# Patient Record
Sex: FEMALE | Race: BLACK OR AFRICAN AMERICAN | NOT HISPANIC OR LATINO | Employment: FULL TIME | URBAN - NONMETROPOLITAN AREA
[De-identification: names, ages, dates, MRNs, and addresses within clinical notes are randomized per-mention and may not be internally consistent; named-entity substitution may affect disease eponyms.]

---

## 2018-01-22 ENCOUNTER — OFFICE VISIT CONVERTED (OUTPATIENT)
Dept: FAMILY MEDICINE CLINIC | Age: 53
End: 2018-01-22
Attending: NURSE PRACTITIONER

## 2018-10-25 ENCOUNTER — OFFICE VISIT CONVERTED (OUTPATIENT)
Dept: FAMILY MEDICINE CLINIC | Age: 53
End: 2018-10-25
Attending: NURSE PRACTITIONER

## 2019-02-11 ENCOUNTER — OFFICE VISIT CONVERTED (OUTPATIENT)
Dept: FAMILY MEDICINE CLINIC | Age: 54
End: 2019-02-11
Attending: NURSE PRACTITIONER

## 2019-02-25 ENCOUNTER — HOSPITAL ENCOUNTER (OUTPATIENT)
Dept: OTHER | Facility: HOSPITAL | Age: 54
Discharge: HOME | End: 2019-02-25
Attending: NURSE PRACTITIONER

## 2019-04-30 ENCOUNTER — OFFICE VISIT CONVERTED (OUTPATIENT)
Dept: FAMILY MEDICINE CLINIC | Age: 54
End: 2019-04-30
Attending: NURSE PRACTITIONER

## 2019-05-25 ENCOUNTER — HOSPITAL ENCOUNTER (OUTPATIENT)
Dept: OTHER | Facility: HOSPITAL | Age: 54
Discharge: HOME | End: 2019-05-25
Attending: NURSE PRACTITIONER

## 2019-05-28 LAB — MEV IGG SER IA-ACNC: 52.8 AU/ML

## 2019-11-12 ENCOUNTER — OFFICE VISIT CONVERTED (OUTPATIENT)
Dept: FAMILY MEDICINE CLINIC | Age: 54
End: 2019-11-12
Attending: NURSE PRACTITIONER

## 2019-11-18 ENCOUNTER — OFFICE VISIT CONVERTED (OUTPATIENT)
Dept: FAMILY MEDICINE CLINIC | Age: 54
End: 2019-11-18
Attending: NURSE PRACTITIONER

## 2019-11-18 ENCOUNTER — HOSPITAL ENCOUNTER (OUTPATIENT)
Dept: OTHER | Facility: HOSPITAL | Age: 54
Discharge: HOME | End: 2019-11-18
Attending: NURSE PRACTITIONER

## 2019-11-18 LAB
ANION GAP SERPL CALC-SCNC: 17 MMOL/L (ref 8–19)
BUN SERPL-MCNC: 17 MG/DL (ref 5–25)
BUN/CREAT SERPL: 16 {RATIO} (ref 6–20)
CALCIUM SERPL-MCNC: 9.6 MG/DL (ref 8.7–10.4)
CHLORIDE SERPL-SCNC: 101 MMOL/L (ref 99–111)
CONV CO2: 27 MMOL/L (ref 22–32)
CREAT UR-MCNC: 1.08 MG/DL (ref 0.5–0.9)
GFR SERPLBLD BASED ON 1.73 SQ M-ARVRAT: >60 ML/MIN/{1.73_M2}
GLUCOSE SERPL-MCNC: 113 MG/DL (ref 65–99)
OSMOLALITY SERPL CALC.SUM OF ELEC: 294 MOSM/KG (ref 273–304)
POTASSIUM SERPL-SCNC: 3.9 MMOL/L (ref 3.5–5.3)
SODIUM SERPL-SCNC: 141 MMOL/L (ref 135–147)

## 2020-01-13 ENCOUNTER — HOSPITAL ENCOUNTER (OUTPATIENT)
Dept: OTHER | Facility: HOSPITAL | Age: 55
Discharge: HOME | End: 2020-01-13
Attending: NURSE PRACTITIONER

## 2020-01-13 LAB
ANION GAP SERPL CALC-SCNC: 16 MMOL/L (ref 8–19)
BUN SERPL-MCNC: 15 MG/DL (ref 5–25)
BUN/CREAT SERPL: 15 {RATIO} (ref 6–20)
CALCIUM SERPL-MCNC: 9.7 MG/DL (ref 8.7–10.4)
CHLORIDE SERPL-SCNC: 99 MMOL/L (ref 99–111)
CONV CO2: 27 MMOL/L (ref 22–32)
CREAT UR-MCNC: 0.99 MG/DL (ref 0.5–0.9)
GFR SERPLBLD BASED ON 1.73 SQ M-ARVRAT: >60 ML/MIN/{1.73_M2}
GLUCOSE SERPL-MCNC: 104 MG/DL (ref 65–99)
OSMOLALITY SERPL CALC.SUM OF ELEC: 287 MOSM/KG (ref 273–304)
POTASSIUM SERPL-SCNC: 4.3 MMOL/L (ref 3.5–5.3)
SODIUM SERPL-SCNC: 138 MMOL/L (ref 135–147)

## 2020-01-21 ENCOUNTER — OFFICE VISIT CONVERTED (OUTPATIENT)
Dept: FAMILY MEDICINE CLINIC | Age: 55
End: 2020-01-21
Attending: NURSE PRACTITIONER

## 2020-02-10 ENCOUNTER — OFFICE VISIT CONVERTED (OUTPATIENT)
Dept: FAMILY MEDICINE CLINIC | Age: 55
End: 2020-02-10
Attending: NURSE PRACTITIONER

## 2020-06-26 ENCOUNTER — HOSPITAL ENCOUNTER (OUTPATIENT)
Dept: OTHER | Facility: HOSPITAL | Age: 55
Discharge: HOME | End: 2020-06-26
Attending: NURSE PRACTITIONER

## 2020-08-11 ENCOUNTER — HOSPITAL ENCOUNTER (OUTPATIENT)
Dept: OTHER | Facility: HOSPITAL | Age: 55
Discharge: HOME | End: 2020-08-11
Attending: NURSE PRACTITIONER

## 2020-08-11 ENCOUNTER — OFFICE VISIT CONVERTED (OUTPATIENT)
Dept: FAMILY MEDICINE CLINIC | Age: 55
End: 2020-08-11
Attending: NURSE PRACTITIONER

## 2020-08-12 LAB
ANION GAP SERPL CALC-SCNC: 18 MMOL/L (ref 8–19)
BUN SERPL-MCNC: 15 MG/DL (ref 5–25)
BUN/CREAT SERPL: 14 {RATIO} (ref 6–20)
CALCIUM SERPL-MCNC: 9.7 MG/DL (ref 8.7–10.4)
CHLORIDE SERPL-SCNC: 100 MMOL/L (ref 99–111)
CONV CO2: 27 MMOL/L (ref 22–32)
CREAT UR-MCNC: 1.05 MG/DL (ref 0.5–0.9)
GFR SERPLBLD BASED ON 1.73 SQ M-ARVRAT: >60 ML/MIN/{1.73_M2}
GLUCOSE SERPL-MCNC: 105 MG/DL (ref 65–99)
OSMOLALITY SERPL CALC.SUM OF ELEC: 293 MOSM/KG (ref 273–304)
POTASSIUM SERPL-SCNC: 4.2 MMOL/L (ref 3.5–5.3)
SODIUM SERPL-SCNC: 141 MMOL/L (ref 135–147)

## 2021-01-26 ENCOUNTER — HOSPITAL ENCOUNTER (OUTPATIENT)
Dept: OTHER | Facility: HOSPITAL | Age: 56
Discharge: HOME | End: 2021-01-26
Attending: NURSE PRACTITIONER

## 2021-01-26 ENCOUNTER — OFFICE VISIT CONVERTED (OUTPATIENT)
Dept: FAMILY MEDICINE CLINIC | Age: 56
End: 2021-01-26
Attending: NURSE PRACTITIONER

## 2021-01-27 LAB
ANION GAP SERPL CALC-SCNC: 14 MMOL/L (ref 8–19)
BUN SERPL-MCNC: 18 MG/DL (ref 5–25)
BUN/CREAT SERPL: 16 {RATIO} (ref 6–20)
CALCIUM SERPL-MCNC: 9.2 MG/DL (ref 8.7–10.4)
CHLORIDE SERPL-SCNC: 101 MMOL/L (ref 99–111)
CONV CO2: 30 MMOL/L (ref 22–32)
CREAT UR-MCNC: 1.15 MG/DL (ref 0.5–0.9)
GFR SERPLBLD BASED ON 1.73 SQ M-ARVRAT: >60 ML/MIN/{1.73_M2}
GLUCOSE SERPL-MCNC: 98 MG/DL (ref 65–99)
OSMOLALITY SERPL CALC.SUM OF ELEC: 294 MOSM/KG (ref 273–304)
POTASSIUM SERPL-SCNC: 3.7 MMOL/L (ref 3.5–5.3)
SODIUM SERPL-SCNC: 141 MMOL/L (ref 135–147)

## 2021-05-18 NOTE — PROGRESS NOTES
Karen Woodward  1965     Office/Outpatient Visit    Visit Date: Tue, Jan 21, 2020 01:16 pm    Provider: Meenu Casanova N.P. (Assistant: Spurling, Sarah C, MA)    Location: [facility]        Electronically signed by Meenu Casanova N.P. on  01/21/2020 08:35:07 PM                             Subjective:        CC: Ms. Woodward is a 54 year old Black or  female.  This is a follow-up visit.  bp has been high for the last two days;         HPI: seen with maximiliano villela student          Patient presents with essential (primary) hypertension.  Her current cardiac medication regimen includes lisinopril/ hctz.  Compliance with treatment has been good; she takes her medication as directed.  She is tolerating the medication well without side effects.  She did not bring her blood pressure diary, but says that typical readings show systolics in the 150s and diastolics in the 70s to 80s.  pt states she is upset and could not wait for her appt with maximiliano wall next week.  she feels as if her bp readings have been a hypertensive emergency and she is not getting adequate response to  her concerns.  [id number] mg / hctz 12.5 mg 1/2 tab daily has been taken since 2016 .  wonders if she should take something different.  eye exam last month at dr osei kruse.  has burst blood vessel left eye.  dilated eye exam normal.  they told her she is rubbing her eyes due to allergies and to stop doing that.   she has mild headaches intermittent without vision change, weakness, numbness, slurred speech.  states she has a strong family hx and she had better not have a heart attack or a stroke due to her bp elevations. hx negative for hyperlipidemia.      ROS:     CONSTITUTIONAL:  Negative for chills, fatigue, fever, and weight change.      EYES:  Positive for use of glasses.   Negative for blurred vision, eye drainage, eye pain, photophobia or itching.      CARDIOVASCULAR:  Negative for chest  pain, palpitations, tachycardia, orthopnea, and edema.      NEUROLOGICAL:  Positive for headaches ( mild, intermittent ).   Negative for ataxia, paresthesias or weakness.          Past Medical History / Family History / Social History:         Last Reviewed on 2019 06:22 PM by Flaquita Castro    Past Medical History:                 PAST MEDICAL HISTORY         Hypertension     Endometriosis     wears hearing aids/bilateral         GYNECOLOGICAL HISTORY:     miscarriage 1    Last mammogram was 19         PREVENTIVE HEALTH MAINTENANCE             COLORECTAL CANCER SCREENING: Up to date (colonoscopy q10y; sigmoidoscopy q5y; Cologuard q3y) was last done , Results are in chart     MAMMOGRAM: Done within last 2 years and results in are chart was last done 02- stable         Surgical History:         Hysterectomy: TAHBSO;     tympanoplasty L ear with hearing loss;     Procedures:    Colonoscopy ( 16, hemorrhoids, Dr. KATIA Vigil )         Family History:     Father:  at age 50's; Cause of death was MI     Mother: Hypertension;  Hyperthyroidism     Brother(s): 1 brother(s) total;  Hypertension     Son(s): Healthy; 1 son(s) total     Daughter(s): 1 daughter(s) total     Maternal Grandmother: Hypertension         Social History:     Occupation: [email or web address] dept     Marital Status:      Children: 2 children         Tobacco/Alcohol/Supplements:     Last Reviewed on 2019 06:16 PM by Sharmaine Simons    Tobacco: She has never smoked.          Alcohol:  Does not drink alcohol and never has.          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, face and neck    Encounter for general adult medical examination without abnormal findings    Encounter for screening for other disorder    Encounter for immunization    Abnormal results of kidney function studies        Immunizations:     influenza, injectable, quadrivalent, preservative free 10/1/2019    Tdap (BOOSTRIX TDAP) 11/27/2019    zzFluzone pf-quadrivalent 3 and up 11/10/2016    Fluzone (3 + years dose) 10/30/2018        Allergies:     Last Reviewed on 11/18/2019 06:16 PM by Sharmaine Simons    Premarin: Rash         Current Medications:     Last Reviewed on 1/21/2020 01:19 PM by Spurling, Sarah C    lisinopril-hydrochlorothiazide 10-12.5 mg oral tablet [Take  one half  pill daily]    amitriptyline 25 mg oral tablet [Take 1 tablet(s) by mouth at bedtime  prn]    estradioL 0.5 mg oral tablet [1 tab daily PO]        Objective:        Vitals:         Historical:     11/27/2019  BP:   121/88 mm Hg ( (left arm, , sitting, );) 11/20/2019  BP:   142/86 mm Hg ( (left arm, , sitting, );) 11/18/2019  Wt:   131.8lbs    Current: 1/21/2020 1:21:42 PM    Ht:  5 ft, 5.5 in;  Wt: 134.2 lbs;  BMI: 22.0T: 98.7 F (oral);  BP: 153/81 mm Hg (left arm, sitting);  P: 77 bpm (left arm (BP Cuff), sitting);  sCr: 0.99 mg/dL;  GFR: 63.17        Repeat:     1:22:32 PM  BP:   139/98mm Hg (right arm, sitting, Heart Rate: 78)     Exams:     PHYSICAL EXAM:     GENERAL: no apparent distress;     EYES: left subconjunctival hemorrhage;     RESPIRATORY: normal appearance and symmetric expansion of chest wall; normal breath sounds with no rales, rhonchi, wheezes or rubs;     CARDIOVASCULAR: normal rate; rhythm is regular;     NEUROLOGIC: mental status: alert and  oriented x 3; GROSSLY INTACT     PSYCHIATRIC: affect/demeanor: agitated;         Assessment:         I10   Essential (primary) hypertension           ORDERS:         Meds Prescribed:       [New Rx] lisinopriL-hydrochlorothiazide 10-12.5 mg oral tablet [take 1 tablet by oral route once daily], #30 (thirty) tablets, Refills: 0 (zero)                 Plan: dr franz last month marianne kruse.          Essential (primary) hypertension        RECOMMENDATIONS given include: avoid pseudoephedrine or other stimulants/decongestants in common cold remedies, perform routine monitoring of blood pressure with home blood pressure cuff, take medication as prescribed, try not to miss doses, and she is on low dose lisinopril/ hctz without side effects.  current guidelines suggest first increasing her current medications before changing or adding a third agent.  reassrue that hypertensive emergency is bp 180/110 or higher.  to obtain copy of recent eye exam.    she states she wants 6 months of medication.  she states that maximiliano wall is her PCP.  I encourage her that continue with same person for chronic care in order to get best continuity of care.  follow up with maximiliano wall within the next one month or sooner if concerns..      she does not want to reschedule appt next week with maximiliano for a later time.  recommend 2-3 wks to see how she would respond to medication dosage change.            Prescriptions:       [New Rx] lisinopriL-hydrochlorothiazide 10-12.5 mg oral tablet [take 1 tablet by oral route once daily], #30 (thirty) tablets, Refills: 0 (zero)           Patient Education Handouts:       High Blood Pressure (HTN)              Patient Recommendations:        For  Essential (primary) hypertension:    Certain decongestants and stimulants (like pseudoephedrine) in common cold remedies raise blood pressure, sometimes to dangerously high levels. Never take with MAO inhibitors! Begin monitoring your blood pressure by brief nurse  visits at our office, a home blood pressure monitor, or by checking on the machines in pharmacies or stores.  Keep a log of the readings.              Charge Capture:         Primary Diagnosis:     I10  Essential (primary) hypertension           Orders:      [id number]  Office/outpatient visit; established patient, level 3  (In-House)

## 2021-05-18 NOTE — PROGRESS NOTES
Karen Woodward  1965     Office/Outpatient Visit    Visit Date: Tue, Aug 11, 2020 03:52 pm    Provider: Flaquita Castro N.P. (Assistant: Maya Garg MA)    Location: [facility]        Electronically signed by Flaquita Castro N.P. on  2020 04:15:50 PM                             Subjective:        CC: Ms. Woodward is a 54 year old Black or  female.  Follow up on blood pressure.;         HPI:           Dx with essential (primary) hypertension; her current cardiac medication regimen includes a combination medication ( Zestoretic ).  was 10 /12.5 and now on .5She did not bring her blood pressure diary, but says that typical readings show systolics in the 120s and diastolics in the 70s.  She is tolerating the medication well without side effects.  Compliance with treatment has been good; she takes her medication as directed.  She said her bp was up, she went to another clinic and her dose of rx was adjusted and that has helped her BP.      ROS:     CONSTITUTIONAL:  Negative for fever.      CARDIOVASCULAR:  Negative for chest pain, palpitations, tachycardia, orthopnea, and edema.      RESPIRATORY:  Negative for recent cough and dyspnea.      NEUROLOGICAL:  Positive for headaches ( migraine; elavil helps ).      ENDOCRINE:  Positive for needs her HRT refilled, now just taking QOD.          Past Medical History / Family History / Social History:         Last Reviewed on 2020 03:58 PM by Flaquita Castro    Past Medical History:                 PAST MEDICAL HISTORY         Hypertension     Endometriosis     wears hearing aids/bilateral         GYNECOLOGICAL HISTORY:     miscarriage 1         PREVENTIVE HEALTH MAINTENANCE             COLORECTAL CANCER SCREENING: Up to date (colonoscopy q10y; sigmoidoscopy q5y; Cologuard q3y) was last done , Results are in chart     MAMMOGRAM: Done within last 2 years and results in are chart was last done 20 with  normal results     PAP SMEAR: No longer indicated due to age and history         Surgical History:         Hysterectomy: TAHBSO;     tympanoplasty L ear with hearing loss;         Family History:     Father:  at age 50's; Cause of death was MI     Mother: Hypertension;  Hyperthyroidism     Brother(s): 1 brother(s) total;  Hypertension     Son(s): Healthy; 1 son(s) total     Daughter(s): 1 daughter(s) total     Maternal Grandmother: Hypertension         Social History:     Occupation: [employer] de[employer]     Marital Status:      Children: 2 children         Tobacco/Alcohol/Supplements:     Last Reviewed on 2020 03:53 PM by Maya Garg    Tobacco: She has never smoked.          Alcohol:  Does not drink alcohol and never has.          Substance Abuse History:     Last Reviewed on 2019 04:04 PM by Serena Castro        Mental Health History:     Last Reviewed on 2019 04:04 PM by Serena Castro        Communicable Diseases (eg STDs):     Last Reviewed on 2019 04:04 PM by Serena Castro        Immunizations:     influenza, injectable, quadrivalent, preservative free 10/1/2019    Tdap (BOOSTRIX TDAP) 2019    zzFluzone pf-quadrivalent 3 and up 11/10/2016    Fluzone (3 + years dose) 10/30/2018        Allergies:     Last Reviewed on 2020 03:52 PM by Maya Garg    Premarin: Rash         Current Medications:     Last Reviewed on 2020 03:53 PM by Maya Garg    amitriptyline 25 mg oral tablet [Take 1 tablet(s) by mouth at bedtime  prn]    estradioL 0.5 mg oral tablet [1 tab daily PO]    lisinopriL-hydrochlorothiazide 10-12.5 mg oral tablet [take 1 tablet by oral route once daily]on .5 went elsewhere in         Objective:        Vitals:         Current: 2020 3:52:17 PM    Ht:  5 ft, 5.5 in;  Wt: 133.8 lbs;  BMI: 21.9T: 98.4 F (oral);  BP: 131/79 mm Hg (left arm, sitting);  P: 74 bpm (left arm (BP Cuff), sitting);  sCr: 0.99 mg/dL;  GFR:  63.09        Exams:     PHYSICAL EXAM:     GENERAL: vital signs recorded - well developed, well nourished;  no apparent distress;     NECK: carotid exam reveals no bruits;     RESPIRATORY: normal respiratory rate and pattern with no distress; normal breath sounds with no rales, rhonchi, wheezes or rubs;     CARDIOVASCULAR: normal rate; rhythm is regular;  no systolic murmur; no edema;     PSYCHIATRIC:  appropriate affect and demeanor; normal speech pattern; grossly normal memory;         Assessment:         Z13.31   Encounter for screening for depression       I10   Essential (primary) hypertension       G43.109   Migraine with aura, not intractable, without status migrainosus       Z79.890   Hormone replacement therapy (postmenopausal)           ORDERS:         Meds Prescribed:       [Refilled] lisinopriL-hydrochlorothiazide 20-12.5 mg oral tablet [take 1 tablet by oral route once daily], #90 (ninety) tablets, Refills: 1 (one)       [Refilled] amitriptyline 25 mg oral tablet [Take 1 tablet(s) by mouth at bedtime  prn], #90 (ninety) tablets, Refills: 1 (one)       [Refilled] estradioL 0.5 mg oral tablet [1 tab daily PO], #90 (ninety) tablets, Refills: 0 (zero)         Lab Orders:       [id number]  [facility] Basic Metabolic Panel  (Send-Out)              Other Orders:         Depression screen negative  (In-House)                      Plan:         Encounter for screening for depression    MIPS PHQ-9 Depression Screening: Completed form scanned and in chart; Total Score 1; Negative Depression Screen           Orders:         Depression screen negative  (In-House)              Essential (primary) hypertension    LABORATORY:  Labs ordered to be performed today include basic metabolic panel.      RECOMMENDATIONS given include: perform routine monitoring of blood pressure with home blood pressure cuff.      FOLLOW-UP: Schedule a follow-up visit in 6 months.            Prescriptions:       [Refilled]  lisinopriL-hydrochlorothiazide 20-12.5 mg oral tablet [take 1 tablet by oral route once daily], #90 (ninety) tablets, Refills: 1 (one)           Orders:       [id number]  [facility] Basic Metabolic Panel  (Send-Out)              Migraine with aura, not intractable, without status migrainosus          Prescriptions:       [Refilled] amitriptyline 25 mg oral tablet [Take 1 tablet(s) by mouth at bedtime  prn], #90 (ninety) tablets, Refills: 1 (one)         Hormone replacement therapy (postmenopausal)reviewed mammogram 6-2020, taking QOD/ wants to continue HRT, risk vs benefit discussed          Prescriptions:       [Refilled] estradioL 0.5 mg oral tablet [1 tab daily PO], #90 (ninety) tablets, Refills: 0 (zero)             Patient Recommendations:        For  Essential (primary) hypertension:    Begin monitoring your blood pressure by brief nurse visits at our office, a home blood pressure monitor, or by checking on the machines in pharmacies or stores.  Keep a log of the readings.  Schedule a follow-up visit in 6 months.              Charge Capture:         Primary Diagnosis:     Z13.31  Encounter for screening for depression           Orders:      [id number]  Office/outpatient visit; established patient, level 4  (In-House)              Depression screen negative  (In-House)              I10  Essential (primary) hypertension     G43.109  Migraine with aura, not intractable, without status migrainosus     Z79.890  Hormone replacement therapy (postmenopausal)

## 2021-05-18 NOTE — PROGRESS NOTES
Karen Woodward  1965     Office/Outpatient Visit    Visit Date: Mon, Feb 10, 2020 05:49 pm    Provider: Flaquita Castro N.P. (Assistant: Anahi Cabrera MA)    Location: [facility]        Electronically signed by Flaquita Castro N.P. on  02/10/2020 09:56:09 PM                             Subjective:        CC: Ms. Woodward is a 54 year old Black or  female.  This is a follow-up visit.  check up;         HPI:           Patient presents with essential (primary) hypertension.  Her current cardiac medication regimen includes a combination medication ( Zestoretic ).  Review of her blood pressure log reveals systolics in the 120-160 and diastolics in the .  She is tolerating the medication well without side effects.  Compliance with treatment has been good; she takes her medication as directed.  Was taking half a pill in am, now taking later in day and taking a whole pill.     ROS:     CONSTITUTIONAL:  Negative for chills, fatigue, fever, and weight change.      CARDIOVASCULAR:  Negative for chest pain, palpitations, tachycardia, orthopnea, and edema.      RESPIRATORY:  Negative for cough, dyspnea, and hemoptysis.      NEUROLOGICAL:  Positive for headaches ( has them intermittently, takes elavil prn ).          Past Medical History / Family History / Social History:         Last Reviewed on 2/10/2020 09:49 PM by Flaquita Castro    Past Medical History:                 PAST MEDICAL HISTORY         Hypertension     Endometriosis     wears hearing aids/bilateral         GYNECOLOGICAL HISTORY:     miscarriage 1    Last mammogram was 19         PREVENTIVE HEALTH MAINTENANCE             COLORECTAL CANCER SCREENING: Up to date (colonoscopy q10y; sigmoidoscopy q5y; Cologuard q3y) was last done -, Results are in chart     MAMMOGRAM: Done within last 2 years and results in are chart was last done 02- stable         Surgical History:         Hysterectomy:  TAHBSO;     tympanoplasty L ear with hearing loss;     Procedures:    Colonoscopy ( 16, hemorrhoids, Dr. KATIA Vigil )         Family History:     Father:  at age 50's; Cause of death was MI     Mother: Hypertension;  Hyperthyroidism     Brother(s): 1 brother(s) total;  Hypertension     Son(s): Healthy; 1 son(s) total     Daughter(s): 1 daughter(s) total     Maternal Grandmother: Hypertension         Social History:     Occupation: [employer] /[employer] de[employer]     Marital Status:      Children: 2 children         Tobacco/Alcohol/Supplements:     Last Reviewed on 2020 01:16 PM by Spurling, Sarah C    Tobacco: She has never smoked.          Alcohol:  Does not drink alcohol and never has.          Substance Abuse History:     Last Reviewed on 2019 04:04 PM by Serena Castro        Mental Health History:     Last Reviewed on 2019 04:04 PM by Serena Castro        Communicable Diseases (eg STDs):     Last Reviewed on 2019 04:04 PM by Serena Castro        Immunizations:     influenza, injectable, quadrivalent, preservative free 10/1/2019    Tdap (BOOSTRIX TDAP) 2019    zzFluzone pf-quadrivalent 3 and up 11/10/2016    Fluzone (3 + years dose) 10/30/2018        Allergies:     Last Reviewed on 2/10/2020 09:50 PM by Flaquita Castro    Premarin: Rash         Current Medications:     Last Reviewed on 2/10/2020 05:55 PM by Anahi Cabrera    amitriptyline 25 mg oral tablet [Take 1 tablet(s) by mouth at bedtime  prn]    estradioL 0.5 mg oral tablet [1 tab daily PO]    lisinopriL-hydrochlorothiazide 10-12.5 mg oral tablet [take 1 tablet by oral route once daily]        Objective:        Vitals:         Current: 2/10/2020 5:56:25 PM    Ht:  5 ft, 5.5 in;  Wt: 132 lbs;  BMI: 21.6T: 97.6 F (oral);  BP: 134/89 mm Hg (left arm, sitting);  P: 83 bpm (left arm (BP Cuff), sitting);  sCr: 0.99 mg/dL;  GFR: 62.73        Exams:     PHYSICAL EXAM:     GENERAL: vital signs recorded - well  developed, well nourished;  no apparent distress;     NECK: carotid exam reveals no bruits;     RESPIRATORY: normal respiratory rate and pattern with no distress; normal breath sounds with no rales, rhonchi, wheezes or rubs;     CARDIOVASCULAR: normal rate; rhythm is regular;  no systolic murmur; no edema;     NEUROLOGIC: GROSSLY INTACT     PSYCHIATRIC:  appropriate affect and demeanor; normal speech pattern; grossly normal memory;         Assessment:         I10   Essential (primary) hypertension       G43.109   Migraine with aura, not intractable, without status migrainosus           ORDERS:         Meds Prescribed:       [Refilled] amitriptyline 25 mg oral tablet [Take 1 tablet(s) by mouth at bedtime  prn], #90 (ninety) tablets, Refills: 0 (zero)       [Refilled] lisinopriL-hydrochlorothiazide 10-12.5 mg oral tablet [take 1 tablet by oral route once daily], #90 (ninety) tablets, Refills: 1 (one)                 Plan:         Essential (primary) hypertensionreviewed blood pressure log and labs last month        RECOMMENDATIONS given include: perform routine monitoring of blood pressure with home blood pressure cuff, reduction of dietary salt intake, take medication as prescribed, try not to miss doses, and Advised about free BP checks on the last Saturday of each month.      FOLLOW-UP: to have blood pressure rechecked           Prescriptions:       [Refilled] lisinopriL-hydrochlorothiazide 10-12.5 mg oral tablet [take 1 tablet by oral route once daily], #90 (ninety) tablets, Refills: 1 (one)         Migraine with aura, not intractable, without status migrainosus          Prescriptions:       [Refilled] amitriptyline 25 mg oral tablet [Take 1 tablet(s) by mouth at bedtime  prn], #90 (ninety) tablets, Refills: 0 (zero)             Patient Recommendations:        For  Essential (primary) hypertension:    Begin monitoring your blood pressure by brief nurse visits at our office, a home blood pressure monitor, or by  checking on the machines in pharmacies or stores.  Keep a log of the readings. Reduce the amount of salt in your diet.              Charge Capture:         Primary Diagnosis:     I10  Essential (primary) hypertension           Orders:      [id number]  Office/outpatient visit; established patient, level 3  (In-House)              G43.109  Migraine with aura, not intractable, without status migrainosus

## 2021-05-18 NOTE — PROGRESS NOTES
Karen Woodward  1965     Office/Outpatient Visit    Visit Date: Tue, Nov 12, 2019 04:33 pm    Provider: Meenu Casanova N.P. (Assistant: Spurling, Sarah C, MA)    Location: [facility]        Electronically signed by Meenu Casanova N.P. on  11/12/2019 10:46:21 PM                             Subjective:        CC: Ms. Woodward is a 54 year old Black or  female.  Pt needs refills, and she pulled a muscle in her back yesterday and she is having some pain with that.;         HPI:           Ms. Woodward presents with radiculopathy, lumbar region.  The discomfort is most prominent in the lower, left thoracic spine.  The pain does not radiate.  She characterizes it as constant, mild in severity, and aching.  This is an acute episode with no prior history of back pain.  She states that the current episode of pain started yesterday.  The event which precipitated this pain was twisting and occured while on plane flying home from [place].  Other details: also states that she usually gets steroid shot twice a year to help her allergies.  usually gets that in october but is one month past due..            right back since 2012.  seen by dr talley in the past.  does not think it has increased size.  wants another opinion today about this area.      ROS:     CONSTITUTIONAL:  Negative for chills, fatigue, fever, and weight change.      CARDIOVASCULAR:  Negative for chest pain, palpitations, tachycardia, orthopnea, and edema.      RESPIRATORY:  Negative for cough, dyspnea, and hemoptysis.      GASTROINTESTINAL:  Negative for abdominal pain, heartburn, constipation, diarrhea, and stool changes.      MUSCULOSKELETAL:  Positive for back pain ( acute ).      INTEGUMENTARY/BREAST:  Positive for skin nodule on back.      NEUROLOGICAL:  Negative for dizziness, headaches, paresthesias, and weakness.          Past Medical History / Family History / Social History:         Last Reviewed on 2/11/2019  05:24 PM by Flaquita Castro    Past Medical History:                 PAST MEDICAL HISTORY         Hypertension     Endometriosis     wears hearing aids/bilateral         GYNECOLOGICAL HISTORY:     miscarriage 1    Last mammogram was 19         PREVENTIVE HEALTH MAINTENANCE             COLORECTAL CANCER SCREENING: Up to date (colonoscopy q10y; sigmoidoscopy q5y; Cologuard q3y) was last done , Results are in chart     MAMMOGRAM: Done within last 2 years and results in are chart was last done 02- stable         Surgical History:         Hysterectomy: TAHBSO;     tympanoplasty L ear with hearing loss;     Procedures:    Colonoscopy ( 16, hemorrhoids, Dr. KATIA Vigil )         Family History:     Father:  at age 50's; Cause of death was MI     Mother: Hypertension;  Hyperthyroidism     Maternal Grandmother: Hypertension         Social History:     Occupation: [employer]     Marital Status:      Children: 2 children         Tobacco/Alcohol/Supplements:     Last Reviewed on 2019 06:53 PM by Nakita Yap    Tobacco: She has never smoked.          Alcohol:  Does not drink alcohol and never has.          Substance Abuse History:     Last Reviewed on 2019 04:04 PM by Serena Castro        Mental Health History:     Last Reviewed on 2019 04:04 PM by Serena Castro        Communicable Diseases (eg STDs):     Last Reviewed on 2019 04:04 PM by Serena Castro        Current Problems:     Last Reviewed on 2019 04:04 PM by Serena Castro    Hormone replacement therapy (postmenopausal)    Migraine with aura, not intractable, without status migrainosus    Essential (primary) hypertension    Hormone replacement therapy (postmenopausal)    Hypertension    Classic migraine    High-risk sexual behavior    Allergic rhinitis, unspecified    Seasonal allergies        Immunizations:     zzFluzone pf-quadrivalent 3 and up 11/10/2016    Fluzone (3 + years  dose) 10/30/2018        Allergies:     Last Reviewed on 4/30/2019 06:53 PM by Nakita Yap    Premarin: rash         Current Medications:     Last Reviewed on 4/30/2019 06:53 PM by Nakita Yap    Amitriptyline HCl 25mg Tablet [Take 1 tablet(s) by mouth at bedtime  prn]    Lisinopril/Hydrochlorothiazide 10mg/12.5mg Tablet [Take  one half  pill daily]    Estradiol 0.5mg Tablet [1 tab daily PO]        Objective:        Vitals:         Current: 11/12/2019 4:37:37 PM    Ht:  5 ft, 5.5 in;  Wt: 134.8 lbs;  BMI: 22.1T: 98.1 F (oral);  BP: 147/90 mm Hg (right arm, sitting);  P: 80 bpm (right arm (BP Cuff), sitting);  sCr: 0.99 mg/dL;  GFR: 63.29        Exams:     PHYSICAL EXAM:     GENERAL:  well developed and nourished; appropriately groomed; in no apparent distress;     RESPIRATORY: normal respiratory rate and pattern with no distress; normal breath sounds with no rales, rhonchi, wheezes or rubs;     CARDIOVASCULAR: normal rate; rhythm is regular;     BREAST/INTEGUMENT: lipoma ( right back along braline rubbery, mobile 1 cm );     MUSCULOSKELETAL: decreased range of motion noted in: back flexion and extension;  pain with range of motion in: back flexion and extension;     NEUROLOGIC: mental status: alert and oriented x 3; GROSSLY INTACT     PSYCHIATRIC:  appropriate affect and demeanor; normal speech pattern; grossly normal memory;         Procedures:     Radiculopathy, lumbar region    1. 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  tylenol prn x 5 days then can change to nsaid such as ibuprofen or aleve..      discuss steroid antiinflammatories vx non steroid antiinflammatories.  do not take a steroid and nsaid at the same time.  she prefers to get kenalog/ steroid injection today instead of toradol injection.  will need thoracic spine xray if not improving.            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  does not think it has changed in size.  observe..              Patient Recommendations:        For  Radiculopathy, lumbar region:    I also recommend baclofen may cause drowsiness.  tylenol prn x 5 days then can change to nsaid such as ibuprofen or aleve..              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

## 2021-05-18 NOTE — PROGRESS NOTES
Karen Woodward 1965     Office/Outpatient Visit    Visit Date: Thu, Oct 25, 2018 06:38 pm    Provider: Staci Musa N.P. (Assistant: Serena Castro MA)    Location: [facility]        Electronically signed by Staci Musa N.P. on  10/27/2018 12:04:36 PM                             SUBJECTIVE:        CC:     Ms. Woodward is a 53 year old Black or  female.  Patient is here for Kenalog shot and medication refills.;         HPI:         Bronchitis: Pt states productive green mucous cough x 1-2 weeks. Takes otc meds with some relief. States getting a steroid shot about once per year and it helps more than anything.      ROS:     CONSTITUTIONAL:  Negative for chills, fatigue, fever, and weight change.      EYES:  Negative for blurred vision.      CARDIOVASCULAR:  Negative for chest pain, orthopnea, paroxysmal nocturnal dyspnea and pedal edema.      RESPIRATORY:  Positive for recent cough.   Negative for dyspnea.      GASTROINTESTINAL:  Negative for abdominal pain, constipation, diarrhea, nausea and vomiting.      NEUROLOGICAL:  Negative for dizziness, headaches, paresthesias, and weakness.      PSYCHIATRIC:  Negative for anxiety, depression, and sleep disturbances.          PM/FM/SH:     Last Reviewed on 10/27/2018 11:49 AM by Staci Musa    Past Medical History:                 PAST MEDICAL HISTORY         Hypertension     Endometriosis     wears hearing aids/bilateral         GYNECOLOGICAL HISTORY:     miscarriage 1         PREVENTIVE HEALTH MAINTENANCE             COLORECTAL CANCER SCREENING: Up to date (colonoscopy q10y; sigmoidoscopy q5y; Cologuard q3y) was last done , Results are in chart     MAMMOGRAM: Done within last 2 years and results in are chart was last done 02- stable         Surgical History:         Hysterectomy: TAHBSO;     tympanoplasty L ear with hearing loss;     Procedures:    Colonoscopy ( 16, hemorrhoids, Dr. KATIA Vigli )          Family History:     Father:  at age 50's; Cause of death was MI     Mother: Hypertension;  Hyperthyroidism     Maternal Grandmother: Hypertension         Social History:     Occupation: [employer] /[employer] de[employer]     Marital Status:      Children: 2 children         Tobacco/Alcohol/Supplements:     Last Reviewed on 10/27/2018 11:49 AM by Staci Musa    Tobacco: She has never smoked.          Alcohol:  Does not drink alcohol and never has.          Substance Abuse History:     Last Reviewed on 10/27/2018 11:49 AM by Staci Musa        Mental Health History:     Last Reviewed on 10/27/2018 11:49 AM by Staci Musa        Communicable Diseases (eg STDs):     Last Reviewed on 10/27/2018 11:49 AM by Staci Musa            Current Problems:     Last Reviewed on 10/27/2018 11:49 AM by Staci Musa    High-risk sexual behavior     Hormone replacement therapy (postmenopausal)     Classic migraine     Hypertension     Acute bronchitis     Seasonal allergies         Immunizations:     zzFluzone pf-quadrivalent 3 and up 11/10/2016         Allergies:     Last Reviewed on 10/27/2018 11:49 AM by Staci Musa    Premarin: rash        Current Medications:     Last Reviewed on 10/27/2018 11:49 AM by Staci Musa    Estradiol 0.5mg Tablet 1 tab daily PO     Lisinopril/Hydrochlorothiazide 10mg/12.5mg Tablet Take  one half  pill daily     Amitriptyline HCl 25mg Tablet Take 1 tablet(s) by mouth at bedtime  prn         OBJECTIVE:        Vitals:         Current: 10/25/2018 6:43:03 PM    Ht:  5 ft, 5.5 in;  Wt: 132.8 lbs;  BMI: 21.8    T: 98.2 F (oral);  BP: 144/84 mm Hg (left arm, sitting);  P: 77 bpm (left arm (BP Cuff), sitting);  sCr: 0.99 mg/dL;  GFR: 63.59        Exams:     PHYSICAL EXAM:     GENERAL: vital signs recorded - well developed, well nourished;  no apparent distress;     EYES: extraocular movements intact; conjunctiva and cornea are normal; PERRLA;     E/N/T: EARS:   normal external auditory canals and tympanic membranes;  grossly normal hearing; NOSE: nasal mucosa is erythematous;  OROPHARYNX: oral mucosa reveals erythema;     NECK: range of motion is normal; thyroid is non-palpable;     RESPIRATORY: normal respiratory rate and pattern with no distress; normal breath sounds with no rales, rhonchi, wheezes or rubs;     CARDIOVASCULAR: normal rate; rhythm is regular;  no systolic murmur; no edema;     GASTROINTESTINAL: nontender; normal bowel sounds; no organomegaly;     NEUROLOGICAL:  cranial nerves, motor and sensory function, reflexes, gait and coordination are all intact;     PSYCHIATRIC:  appropriate affect and demeanor; normal speech pattern; grossly normal memory;         Procedures:     Acute bronchitis     1. Kenalog given IM in the right hip; administered by [facility];  lot number [id number]; expires Jan. 2020; Procedure Consent Form signed ./mlb             ASSESSMENT:           466.0   J20.9  Acute bronchitis              DDx:         ORDERS:         Meds Prescribed:       Bromfed-DM (Brompheniramine/Dextromethorphan/Pseudoephedrine) Syrup 1  to 2  tsp po every 4-6 hrs prn cough/congestion.  #240 ([address] [place] [address]) ml Refills: 0         Other Orders:       [id number]  Therapeutic injection  (In-House)           Kenalog, per 10 mg (x6)                 PLAN:          Acute bronchitis     Steroids Kenalog 60 mg 1.5 ml           Prescriptions:       Bromfed-DM (Brompheniramine/Dextromethorphan/Pseudoephedrine) Syrup 1  to 2  tsp po every 4-6 hrs prn cough/congestion.  #240 ([address] [place]) ml Refills: 0           Orders:       [id number]  Therapeutic injection  (In-House)                     Kenalog, per 10 mg (x6)           Patient Education Handouts:       Acute Bronchitis              CHARGE CAPTURE:           Primary Diagnosis:     466.0 Acute bronchitis            J20.9    Acute bronchitis, unspecified              Orders:          [id number]   Office/outpatient visit;  established patient, level 3  (In-House)             42723   Therapeutic injection  (In-House)                                           Kenalog, per 10 mg (x6)

## 2021-05-18 NOTE — PROGRESS NOTES
"Karen Woodward  1965     Office/Outpatient Visit    Visit Date: Mon, Nov 18, 2019 06:13 pm    Provider: Flaquita Castro N.P. (Assistant: Sharmaine Simons MA)    Location: [facility]        Electronically signed by Flaquita Castro N.P. on  11/18/2019 10:03:53 PM                             Subjective:        CC: Ms. Woodward is a 54 year old Black or  female.  annual check up;         HPI:           Ms. Woodward presents with encounter for general adult medical examination without abnormal findings.  HEALTH RISK PROFILE (\"At Risk\" items are starred):     Smoking: Life-long non-smoker;     Cancer Screening: Performs monthly breast self-exams;  Current with screening mammograms;  hx of TAHBSO;  UTD, colon screening in 2016/ hemorrhoids;     Immunization Status: ** >10 years since last Td booster;     Nutrition: Healthy, well-balanced diet;     Physical Activity: Exercises at least 3 times per week;     Alcohol/Drug Use: Is a non-drinker; No illicit drug use;     Safety: Always wears seatbelt;           PHQ-9 Depression Screening: Completed form scanned and in chart; Total Score 1     ROS:     CONSTITUTIONAL:  Negative for chills and fever.      EYES:  Negative for blurred vision.      E/N/T:  Negative for diminished hearing and nasal congestion.      CARDIOVASCULAR:  Negative for chest pain and palpitations.      RESPIRATORY:  Negative for recent cough and dyspnea.      GASTROINTESTINAL:  Negative for abdominal pain, melena, nausea and vomiting.      GENITOURINARY:  Negative for vaginal discharge.      MUSCULOSKELETAL:  Positive for left side pain, better this week.  wearing a corset today to help with her side pain, was given a steroid inj and taking muscle relaxer     INTEGUMENTARY/BREAST:  Negative for atypical mole(s) and rash.      NEUROLOGICAL:  Negative for paresthesias and weakness.      PSYCHIATRIC:  Negative for anxiety and depression.          Past Medical History / " Family History / Social History:         Last Reviewed on 2019 06:22 PM by Flaquita Castro    Past Medical History:                 PAST MEDICAL HISTORY         Hypertension     Endometriosis     wears hearing aids/bilateral         GYNECOLOGICAL HISTORY:     miscarriage 1    Last mammogram was 19         PREVENTIVE HEALTH MAINTENANCE             COLORECTAL CANCER SCREENING: Up to date (colonoscopy q10y; sigmoidoscopy q5y; Cologuard q3y) was last done , Results are in chart     MAMMOGRAM: Done within last 2 years and results in are chart was last done 02- stable         Surgical History:         Hysterectomy: TAHBSO;     tympanoplasty L ear with hearing loss;     Procedures:    Colonoscopy ( 16, hemorrhoids, Dr. KATIA Vigil )         Family History:     Father:  at age 50's; Cause of death was MI     Mother: Hypertension;  Hyperthyroidism     Brother(s): 1 brother(s) total;  Hypertension     Son(s): Healthy; 1 son(s) total     Daughter(s): 1 daughter(s) total     Maternal Grandmother: Hypertension         Social History:     Occupation: [employer] /[employer] de[employer]     Marital Status:      Children: 2 children         Tobacco/Alcohol/Supplements:     Last Reviewed on 2019 06:16 PM by Sharmaine Simons    Tobacco: She has never smoked.          Alcohol:  Does not drink alcohol and never has.          Substance Abuse History:     Last Reviewed on 2019 04:04 PM by Serena Castro        Mental Health History:     Last Reviewed on 2019 04:04 PM by Serena Castro        Communicable Diseases (eg STDs):     Last Reviewed on 2019 04:04 PM by Serena Castro        Immunizations:     zzFluzone pf-quadrivalent 3 and up 11/10/2016    Fluzone (3 + years dose) 10/30/2018        Allergies:     Last Reviewed on 2019 04:36 PM by Spurling, Sarah C    Premarin: Rash         Current Medications:     Last Reviewed on 2019 06:17 PM by Sharmaine Simons  Alcira    Amitriptyline HCl 25mg Tablet [Take 1 tablet(s) by mouth at bedtime  prn]    lisinopril-hydrochlorothiazide 10-12.5 mg oral tablet [Take  one half  pill daily]    Estradiol 0.5 mg oral tablet [1 tab daily PO]    baclofen 10 mg oral tablet [take 1 tablet (10 mg) by oral route 3 times per day]        Objective:        Vitals:         Current: 11/18/2019 6:18:39 PM    Ht:  5 ft, 5.5 in;  Wt: 131.8 lbs;  BMI: 21.6T: 98.3 F (oral);  BP: 148/79 mm Hg (left arm, sitting);  P: 74 bpm (left arm (BP Cuff), sitting);  sCr: 0.99 mg/dL;  GFR: 62.69        Repeat:     6:42:3 PM  BP:   154/91mm Hg (left arm, sitting) 6:42:11 PM  P:   77bpm (left arm (BP Cuff), sitting)     Exams:     PHYSICAL EXAM:     GENERAL: vital signs recorded - well developed, well nourished;  no apparent distress;     EYES: extraocular movements intact; conjunctiva and cornea are normal; PERRLA;     E/N/T:  normal EACs, TMs, nasal/oral mucosa, teeth, gingiva, and oropharynx;     NECK: range of motion is normal; thyroid is non-palpable;     RESPIRATORY: normal respiratory rate and pattern with no distress; normal breath sounds with no rales, rhonchi, wheezes or rubs;     CARDIOVASCULAR: normal rate; rhythm is regular;  no systolic murmur; no edema;     GASTROINTESTINAL: nontender; normal bowel sounds; no organomegaly;     LYMPHATIC: no enlargement of cervical or facial nodes;     BREAST/INTEGUMENT: SKIN: no significant rashes or lesions; no suspicious moles;     MUSCULOSKELETAL:  Normal range of motion, strength and tone;     NEUROLOGIC: GROSSLY INTACT     PSYCHIATRIC:  appropriate affect and demeanor; normal speech pattern; grossly normal memory;         Assessment:         Z00.00   Encounter for general adult medical examination without abnormal findings       Z13.89   Encounter for screening for other disorder       I10   Essential (primary) hypertension           ORDERS:         Lab Orders:       [id number]  [facility] Basic Metabolic Panel   (Send-Out)              Other Orders:         Depression screen negative  (In-House)                      Plan:         Encounter for general adult medical examination without abnormal findingsmammogram 3-2019/she has had her flu vaccine this season, advised to get updated with tetanus vaccine, rx given    LABORATORY:  Labs ordered to be performed today include basic metabolic panel.      COUNSELING was provided today regarding the following topics: healthy eating habits, regular exercise, breast self-exam, and use of seat belts.      FOLLOW-UP: mammogram in 3-2020           Orders:       [id number]  [facility] Basic Metabolic Panel  (Send-Out)              Encounter for screening for other disorder    MIPS PHQ-9 Depression Screening: Completed form scanned and in chart; Total Score 1; Negative Depression Screen           Orders:         Depression screen negative  (In-House)              Essential (primary) hypertensionfollow up / no charge for recheck BP /reviewed her lipid panel in         RECOMMENDATIONS given include: perform routine monitoring of blood pressure with home blood pressure cuff and reduction of dietary salt intake.      FOLLOW-UP: recheck bp             Patient Recommendations:        For  Encounter for general adult medical examination without abnormal findings:        Limit dietary intake of fat (especially saturated fat) and cholesterol.  Eat a variety of foods, including plenty of fruits, vegetables, and grain containg fiber, limit fat intake to 30% of total calories. Balance caloric intake with energy expended.    Maintaining regular physical activity is advised to help prevent heart disease, hypertension, diabetes, and obesity.    You should regularly examine your breasts, easily done while in the shower or with lotion.  Feel and look for differences in consistency from month to month, especially noting knots or lumps, changes in skin appearance, nipple retraction or discharge.     Always use shoulder/lap restraints when driving or riding in a vehicle, even those equipped with air bags.          For  Essential (primary) hypertension:    Begin monitoring your blood pressure by brief nurse visits at our office, a home blood pressure monitor, or by checking on the machines in pharmacies or stores.  Keep a log of the readings. Reduce the amount of salt in your diet.              Charge Capture:         Primary Diagnosis:     Z00.00  Encounter for general adult medical examination without abnormal findings           Orders:      [id number]  Preventive medicine, established patient, age 40-64 years  (In-House)              Z13.89  Encounter for screening for other disorder           Orders:        Depression screen negative  (In-House)              I10  Essential (primary) hypertension

## 2021-05-18 NOTE — PROGRESS NOTES
Karen Woodward 1965     Office/Outpatient Visit    Visit Date: Mon, Jan 22, 2018 06:38 pm    Provider: Flaquita Castro N.P. (Assistant: Serena Castro MA)    Location: [facility]        Electronically signed by Flaquita Castro N.P. on  01/22/2018 08:27:13 PM                             SUBJECTIVE:        CC:     Ms. Woodward is a 52 year old Black or  female.  WWE and to get her HRT refilled         HPI:         Patient complains of well Woman Exam.  Her last menstrual period was 2002.  She performs breast self-exams every once a week weeks.    Her last Pap smear was in 11- and was normal.   Her last mammogram was in 01- and was normal.   She has never had a dexa scan. Preventative Health updated today.  Ms. Woodward denies any history of abnormal Pap smears.  Tobacco: She has never smoked.          ROS:     CONSTITUTIONAL:  Negative for chills, fatigue, fever, and weight change.      EYES:  Negative for blurred vision, eye pain, and photophobia.      E/N/T:  Negative for hearing problems, E/N/T pain, congestion, rhinorrhea, epistaxis, hoarseness, and dental problems.      CARDIOVASCULAR:  Negative for chest pain, palpitations, tachycardia, orthopnea, and edema.      RESPIRATORY:  Negative for cough, dyspnea, and hemoptysis.      GASTROINTESTINAL:  Negative for abdominal pain, heartburn, constipation, diarrhea, and stool changes.      GENITOURINARY:  Negative for dysuria and vaginal discharge.      MUSCULOSKELETAL:  Negative for arthralgias, back pain, and myalgias.      INTEGUMENTARY/BREAST:  Negative for atypical moles, dry skin, pruritis, rashes, breast masses, and nipple discharge.      NEUROLOGICAL:  Positive for dizziness ( with positional changes; 3 episodes in the last 24 hours, last episdoe this am ).      ENDOCRINE:  Negative for hair loss, heat/cold intolerance, polydipsia, and polyphagia.      ALLERGIC/IMMUNOLOGIC:  Negative for allergies, frequent  illnesses, HIV exposure, and urticaria.      PSYCHIATRIC:  Negative for anxiety, depression, and sleep disturbances.          PMH/FMH/SH:     Last Reviewed on 2017 09:00 AM by Staci Musa    Past Medical History:                 PAST MEDICAL HISTORY         Hypertension     Endometriosis     wears hearing aids/bilateral         GYNECOLOGICAL HISTORY:     miscarriage 1         PREVENTIVE HEALTH MAINTENANCE             COLORECTAL CANCER SCREENING: Up to date (colonoscopy q10y; sigmoidoscopy q5y; Cologuard q3y) was last done , Results are in chart     MAMMOGRAM: was last done  stable         Surgical History:         Hysterectomy: TAHBSO;     tympanoplasty L ear with hearing loss;     Procedures:    Colonoscopy ( 16, hemorrhoids, Dr. KATIA Vigil )         Family History:     Father:  at age 50's; Cause of death was MI     Mother: Hypertension;  Hyperthyroidism     Maternal Grandmother: Hypertension         Social History:     Occupation: [employer]     Marital Status:      Children: 2 children         Tobacco/Alcohol/Supplements:     Last Reviewed on 2018 06:47 PM by Serena Castro    Tobacco: She has never smoked.          Alcohol:  Does not drink alcohol and never has.              Immunizations:     Fluzone pf-quadrivalent 3 and up 11/10/2016         Allergies:     Last Reviewed on 2018 06:44 PM by Serena Castro    Premarin: rash        Current Medications:     Last Reviewed on 2018 06:38 PM by Serena Castro    Lisinopril/Hydrochlorothiazide 10mg/12.5mg Tablet Take  one half  pill daily     Amitriptyline HCl 25mg Tablet Take 1 tablet(s) by mouth at bedtime  prn     Estradiol 0.5mg Tablet 1 tab daily PO         OBJECTIVE:        Vitals:         Current: 2018 6:44:21 PM    Ht:  5 ft, 5.5 in;  Wt: 129.2 lbs;  BMI: 21.2    T: 98.3 F (oral);  BP: 124/77 mm Hg (left arm, sitting);  P: 71 bpm (left arm (BP Cuff), sitting);  sCr: 1.13  mg/dL;  GFR: 55.67        Exams:     PHYSICAL EXAM:     GENERAL: vital signs recorded - well developed, well nourished;  no apparent distress;     EYES: extraocular movements intact; conjunctiva and cornea are normal; PERRLA;     E/N/T: EARS: bilateral TMs are normal;  NOSE:  normal nasal mucosa, septum, turbinates, and sinuses; OROPHARYNX: posterior pharynx, including tonsils, tongue, and uvula are normal;     NECK: range of motion is normal; thyroid is non-palpable; carotid exam reveals no bruits;     RESPIRATORY: normal respiratory rate and pattern with no distress; normal breath sounds with no rales, rhonchi, wheezes or rubs;     CARDIOVASCULAR: normal rate; rhythm is regular;  no systolic murmur; no edema;     GASTROINTESTINAL: nontender; no organomegaly;     LYMPHATIC: no enlargement of cervical or facial nodes; no axillary adenopathy;     BREAST/INTEGUMENT: BREASTS: breast exam is normal without masses, skin changes, or nipple discharge; SKIN: no significant rashes or lesions; no suspicious moles;     MUSCULOSKELETAL:  Normal range of motion, strength and tone;     NEUROLOGIC: GROSSLY INTACT     PSYCHIATRIC:  appropriate affect and demeanor; normal speech pattern; grossly normal memory;         Lab/Test Results:             Glucose, Urine:  Neg (01/22/2018),     Bilirubin, urine:  Negative (01/22/2018),     Ketones, Urine Strip:  Negative (01/22/2018),     Specific Gravity, urine:  1.020 (01/22/2018),     Blood in Urine:  small (01/22/2018),     pH, urine:  5.5 (01/22/2018),     Protein Urine QL:  negative (01/22/2018),     Urobilinogen, urine:  0.2 E.U./dL (01/22/2018),     Nitrite, Urine:  Negative (01/22/2018),     Leukoctyes, urine:  Negative (01/22/2018),     Appearance:  Clear (01/22/2018),     collection source:  Clean-catch (01/22/2018),     Color:  Yellow (01/22/2018),     Performed by::  dilcia (01/22/2018),             ASSESSMENT:           V72.31     Well Woman Exam     401.1   I10  Hypertension               DDx:     346.00   G43.109  Classic migraine              DDx:         ORDERS:         Meds Prescribed:       Refill of: Lisinopril/Hydrochlorothiazide 10mg/12.5mg Tablet Take  one half  pill daily  #90 (Ninety) tablet(s) Refills: 0       Refill of: Amitriptyline HCl 25mg Tablet Take 1 tablet(s) by mouth at bedtime  prn  #90 (Ninety) tablet(s) Refills: 1       Refill of: Estradiol 0.5mg Tablet 1 tab daily PO  #90 (Ninety) tablet(s) Refills: 0         Radiology/Test Orders:       [id number]  Screening mammography bi 2-view inc CAD  (Send-Out)           Lab Orders:       [id number]  Urinalysis, automated, without microscopy  (In-House)                   PLAN:          Well Woman Exam pap deferred         COUNSELING was provided today regarding the following topics: breast self-exam and reviewed HRT risk vs benefit.      FOLLOW-UP: for her mammogram     RADIOLOGY:  I have ordered screening mammogram to be done today.            Prescriptions:      Refill of: Estradiol 0.5mg Tablet 1 tab daily PO  #90 (Ninety) tablet(s) Refills: 0           Orders:      [id number]  Urinalysis, automated, without microscopy  (In-House)         [id number]  Screening mammography bi 2-view inc CAD  (Send-Out)            Hypertension if her dizziness persist/returns, follow up /last labs            Prescriptions:       Refill of: Lisinopril/Hydrochlorothiazide 10mg/12.5mg Tablet Take  one half  pill daily  #90 (Ninety) tablet(s) Refills: 0          Classic migraine           Prescriptions:       Refill of: Amitriptyline HCl 25mg Tablet Take 1 tablet(s) by mouth at bedtime  prn  #90 (Ninety) tablet(s) Refills: 1             Patient Recommendations:        For  Well Woman Exam:         You should regularly examine your breasts, easily done while in the shower or with lotion.  Feel and look for differences in consistency from month to month, especially noting knots or lumps, changes in skin appearance, nipple retraction or discharge.               CHARGE CAPTURE:           Primary Diagnosis:     V72.31    Well Woman Exam                Z01.419    Encounter for gynecological examination (general) (routine) without abnormal findings                       Orders:          [id number]   Preventive medicine, established patient, age 40-64 years  (In-House)             25225   Urinalysis, automated, without microscopy  (In-House)           401.1 Hypertension            I10    Essential (primary) hypertension    346.00 Classic migraine            G43.109    Migraine with aura, not intractable, without status migrainosus

## 2021-05-18 NOTE — PROGRESS NOTES
Karen Woodward 1965     Office/Outpatient Visit    Visit Date: Mon, Feb 11, 2019 04:00 pm    Provider: Flaquita Castro N.P. (Assistant: Serena Castro MA)    Location: [facility]        Electronically signed by Flaquita Castro N.P. on  02/11/2019 05:28:41 PM                             SUBJECTIVE:        CC:     Ms. Woodward is a 53 year old Black or  female.  Patient is here for pelvic and breast exam.;         HPI:         Patient complains of well Woman Exam.  Her last menstrual period was Hysterectomy.  She performs breast self-exams monthly.    Her last Pap smear was in 11- and was normal.   Her last mammogram was in 02- and was normal.   Her last DEXA was around 4 years ago (per patient, I have sent chart to the team to get records) years ago and was normal.   Preventative Health updated today.  Ms. Woodward denies any history of abnormal Pap smears.  Tobacco: She has never smoked.              PHQ-9 Depression Screening: Completed form scanned and in chart; Total Score 0 Alcohol Consumption Screening: Completed form scanned and in chart; Total Score 0     ROS:     CONSTITUTIONAL:  Negative for chills, fatigue, fever, and weight change.      EYES:  Negative for blurred vision, eye pain, and photophobia.      E/N/T:  Positive for bloody /discolored sinus drainage a week ago, better.  taking mucinex     CARDIOVASCULAR:  Negative for chest pain, palpitations, tachycardia, orthopnea, and edema.      RESPIRATORY:  Negative for cough, dyspnea, and hemoptysis.      GASTROINTESTINAL:  Positive for one episode of blood in stool, now resolved.      GENITOURINARY:  Negative for genital lesions, hematuria, menstrual problems, polyuria, abnormal vaginal bleeding, and vaginal discharge.      MUSCULOSKELETAL:  Negative for arthralgias, back pain, and myalgias.      INTEGUMENTARY/BREAST:  Negative for atypical moles, dry skin, pruritis, rashes, breast masses, and nipple  discharge.      NEUROLOGICAL:  Positive for headaches ( better with elavil and sleep ).  at times     ENDOCRINE:  Negative for hair loss, heat/cold intolerance, polydipsia, and polyphagia.      PSYCHIATRIC:  Positive for insomnia.  elavil helps         PMH/FMH/SH:     Last Reviewed on 2019 05:24 PM by Flaquita Castro    Past Medical History:                 PAST MEDICAL HISTORY         Hypertension     Endometriosis     wears hearing aids/bilateral         GYNECOLOGICAL HISTORY:     miscarriage 1         PREVENTIVE HEALTH MAINTENANCE             COLORECTAL CANCER SCREENING: Up to date (colonoscopy q10y; sigmoidoscopy q5y; Cologuard q3y) was last done , Results are in chart     MAMMOGRAM: Done within last 2 years and results in are chart was last done 02- stable         Surgical History:         Hysterectomy: TAHBSO;     tympanoplasty L ear with hearing loss;     Procedures:    Colonoscopy ( 16, hemorrhoids, Dr. KATIA Vigil )         Family History:     Father:  at age 50's; Cause of death was MI     Mother: Hypertension;  Hyperthyroidism     Maternal Grandmother: Hypertension         Social History:     Occupation: [employer]     Marital Status:      Children: 2 children         Tobacco/Alcohol/Supplements:     Last Reviewed on 2019 04:05 PM by Serena Castro    Tobacco: She has never smoked.          Alcohol:  Does not drink alcohol and never has.          Substance Abuse History:     Last Reviewed on 2019 04:04 PM by Serena Castro        Mental Health History:     Last Reviewed on 2019 04:04 PM by Serena Castro        Communicable Diseases (eg STDs):     Last Reviewed on 2019 04:04 PM by Serena Castro            Immunizations:     zzFluzone pf-quadrivalent 3 and up 11/10/2016         Allergies:     Last Reviewed on 2019 04:01 PM by Serena Castro    Premarin: rash        Current Medications:     Last Reviewed on 2019  04:01 PM by Serena Castro    Amitriptyline HCl 25mg Tablet Take 1 tablet(s) by mouth at bedtime  prn     Estradiol 0.5mg Tablet 1 tab daily PO     Lisinopril/Hydrochlorothiazide 10mg/12.5mg Tablet Take  one half  pill daily         OBJECTIVE:        Vitals:         Current: 2/11/2019 4:04:24 PM    Ht:  5 ft, 5.5 in;  Wt: 129 lbs;  BMI: 21.1    T: 98.1 F (oral);  BP: 129/85 mm Hg (left arm, sitting);  P: 86 bpm (left arm (BP Cuff), sitting);  sCr: 0.99 mg/dL;  GFR: 62.81        Exams:     PHYSICAL EXAM:     GENERAL: vital signs recorded - well developed, well nourished;  no apparent distress;     EYES: extraocular movements intact; conjunctiva and cornea are normal; PERRLA;     E/N/T: EARS: bilateral TMs are normal;  NOSE: no sinus tenderness; OROPHARYNX:  normal mucosa, dentition, gingiva, and posterior pharynx;     NECK: range of motion is normal; thyroid is non-palpable;     RESPIRATORY: normal respiratory rate and pattern with no distress; normal breath sounds with no rales, rhonchi, wheezes or rubs;     CARDIOVASCULAR: normal rate; rhythm is regular;  no systolic murmur; no edema;     GASTROINTESTINAL: nontender; normal bowel sounds; no organomegaly; rectal exam: normal tone; nontender, guaiac negative stool;     GENITOURINARY: external genitalia: normal without lesions or urethral abnormalities;;  vagina: normal with good pelvic support and no lesions or discharge;; cervix: absent (s/p hyst) noted;;  adnexa: normal with no masses or tenderness     LYMPHATIC: no enlargement of cervical or facial nodes; no axillary adenopathy;     BREAST/INTEGUMENT: BREASTS: breast exam is normal without masses, skin changes, or nipple discharge; SKIN: no significant rashes or lesions; no suspicious moles;     MUSCULOSKELETAL:  Normal range of motion, strength and tone;     NEUROLOGIC: GROSSLY INTACT     PSYCHIATRIC:  appropriate affect and demeanor; normal speech pattern; grossly normal memory;         ASSESSMENT:            V72.31     Well Woman Exam     V79.0   Z13.89  Screening for depression              DDx:     346.00   G43.109  Classic migraine              DDx:     401.1   I10  Hypertension              DDx:     V76.41   Z12.12  Screening for rectal cancer              DDx:         ORDERS:         Meds Prescribed:       Refill of: Amitriptyline HCl 25mg Tablet Take 1 tablet(s) by mouth at bedtime  prn  #90 (Ninety) tablet(s) Refills: 1       Refill of: Estradiol 0.5mg Tablet 1 tab daily PO  #90 (Ninety) tablet(s) Refills: 0       Refill of: Lisinopril/Hydrochlorothiazide 10mg/12.5mg Tablet Take  one half  pill daily  #90 (Ninety) tablet(s) Refills: 1         Radiology/Test Orders:       [id number]  Screening digital breast tomosynthesis bi  (Send-Out)           Lab Orders:       [id number]  Urinalysis, automated, without microscopy  (In-House)         [id number]  Occult blood, fecal  (In-House)           Other Orders:         Negative EtOH screen  (In-House)                   PLAN:          Well Woman Exam pap was deferred; mammogram after 2-15-19, risk vs benefit of HRT discussed, she will try taking HRT, QOD or half dose and see how she does /got biometric at work and will bring in her results /had flu vaccine at work /reviewed urine dip in her lab flow sheet, okay         RADIOLOGY:  I have ordered Screening 3D Mammogram Bilateral to be done today.      COUNSELING was provided today regarding the following topics: healthy eating habits, regular exercise, breast self-exam, and use of seat belts.      FOLLOW-UP: Schedule follow-up appointments on a p.r.n. basis.            Prescriptions:      Refill of: Estradiol 0.5mg Tablet 1 tab daily PO  #90 (Ninety) tablet(s) Refills: 0           Orders:      [id number]  Urinalysis, automated, without microscopy  (In-House)         [id number]  Screening digital breast tomosynthesis bi  (Send-Out)             Patient Education Handouts:       Mammography           Screening for depression     MIPS PHQ-9  Depression Screening Completed form scanned and in chart; Total Score 0 Negative alcohol screen           Orders:         Negative EtOH screen  (In-House)            Classic migraine           Prescriptions:       Refill of: Amitriptyline HCl 25mg Tablet Take 1 tablet(s) by mouth at bedtime  prn  #90 (Ninety) tablet(s) Refills: 1          Hypertension           Prescriptions:       Refill of: Lisinopril/Hydrochlorothiazide 10mg/12.5mg Tablet Take  one half  pill daily  #90 (Ninety) tablet(s) Refills: 1          Screening for rectal cancer if any symptoms persist or change, follow up, reviewed her last colon screen           Orders:       [id number]  Occult blood, fecal  (In-House)   X 1 @ [facility] [place]             Patient Recommendations:        For  Well Woman Exam:         Limit dietary intake of fat (especially saturated fat) and cholesterol.  Eat a variety of foods, including plenty of fruits, vegetables, and grain containg fiber, limit fat intake to 30% of total calories. Balance caloric intake with energy expended.    Maintaining regular physical activity is advised to help prevent heart disease, hypertension, diabetes, and obesity.    You should regularly examine your breasts, easily done while in the shower or with lotion.  Feel and look for differences in consistency from month to month, especially noting knots or lumps, changes in skin appearance, nipple retraction or discharge.    Always use shoulder/lap restraints when driving or riding in a vehicle, even those equipped with air bags.  Schedule follow-up appointments as needed.              CHARGE CAPTURE:           Primary Diagnosis:     V72.31    Well Woman Exam                Z01.419    Encounter for gynecological examination (general) (routine) without abnormal findings                       Orders:          [id number]   Preventive medicine, established patient, age 40-64 years  (In-House)             49812   Urinalysis, automated, without microscopy  (In-House)            V79.0 Screening for depression            Z13.89    Encounter for screening for other disorder              Orders:             Negative EtOH screen  (In-House)           346.00 Classic migraine            G43.109    Migraine with aura, not intractable, without status migrainosus    401.1 Hypertension            I10    Essential (primary) hypertension    V76.41 Screening for rectal cancer            Z12.12    Encounter for screening for malignant neoplasm of rectum              Orders:          [id number]   Occult blood, fecal  (In-House)

## 2021-05-18 NOTE — PROGRESS NOTES
Karen Woodward  1965     Office/Outpatient Visit    Visit Date:  02:13 pm    Provider: Flaquita Castro N.P. (Assistant: Monse Camargo MA)    Location: [facility]        Electronically signed by Flaquita Castro N.P. on  2021 03:23:09 PM                             Subjective:        CC: Ms. Woodward is a 55 year old Black or  female.  This is a follow-up visit.          HPI:           Patient to be evaluated for migraine with aura, not intractable, without status migrainosus.  It is improved with Elavil.  takes prn and sometimes may be only once a month          With regard to the essential (primary) hypertension, her current cardiac medication regimen includes a combination medication ( Zestoretic ).  Ms. Woodward does not check her blood pressure other than at her clinic appointments.  She is tolerating the medication well without side effects.  Compliance with treatment has been good; she takes her medication as directed.            Symptoms are relieved with HRT.  Prior work-up has included mammogram .  Will be out in the next month /working well     ROS:     CONSTITUTIONAL:  Negative for fever.      CARDIOVASCULAR:  Negative for chest pain, palpitations, tachycardia, orthopnea, and edema.      RESPIRATORY:  Negative for recent cough and dyspnea.      NEUROLOGICAL:  Negative for paresthesias and weakness.          Past Medical History / Family History / Social History:         Last Reviewed on 2021 02:22 PM by Flaquita Castro    Past Medical History:                 PAST MEDICAL HISTORY         Hypertension     Endometriosis     wears hearing aids/bilateral         GYNECOLOGICAL HISTORY:     miscarriage 1         PREVENTIVE HEALTH MAINTENANCE             COLORECTAL CANCER SCREENING: Up to date (colonoscopy q10y; sigmoidoscopy q5y; Cologuard q3y) was last done -, Results are in chart     MAMMOGRAM: Done within last 2 years  and results in are chart was last done 20 with normal results     PAP SMEAR: no longer due to history/ hysterectomy         Surgical History:         Hysterectomy: TAHBSO;     tympanoplasty L ear with hearing loss;         Family History:     Father:  at age 50's; Cause of death was MI     Mother: Hypertension;  Hyperthyroidism     Brother(s): 1 brother(s) total;  Hypertension     Son(s): Healthy; 1 son(s) total     Daughter(s): 1 daughter(s) total     Maternal Grandmother: Hypertension         Social History:     Occupation: [employer] dept     Marital Status:      Children: 2 children         Tobacco/Alcohol/Supplements:     Last Reviewed on 2021 02:19 PM by Monse Camargo    Tobacco: She has never smoked.          Alcohol:  Does not drink alcohol and never has.          Substance Abuse History:     Last Reviewed on 2019 04:04 PM by Serena Castro        Mental Health History:     Last Reviewed on 2019 04:04 PM by Serena Castro        Communicable Diseases (eg STDs):     Last Reviewed on 2019 04:04 PM by Serena Castro        Immunizations:     influenza, injectable, quadrivalent, preservative free 10/1/2019    Tdap (BOOSTRIX TDAP) 2019    zzFluzone pf-quadrivalent 3 and up 11/10/2016    Fluzone (3 + years dose) 10/30/2018        Allergies:     Last Reviewed on 2021 02:19 PM by Monse Camargo    Premarin: Rash         Current Medications:     Last Reviewed on 2021 02:19 PM by Monse Camargo    amitriptyline 25 mg oral tablet [Take 1 tablet(s) by mouth at bedtime  prn]    estradioL 0.5 mg oral tablet [1 tab daily PO]    lisinopriL-hydrochlorothiazide 20-12.5 mg oral tablet [take 1 tablet by oral route once daily]        Objective:        Vitals:         Current: 2021 2:19:45 PM    Ht:  5 ft, 5.5 in;  Wt: 134.8 lbs;  BMI: 22.1T: 97.1 F (temporal);  BP: 132/83 mm Hg (right arm, sitting);  P: 78 bpm (right arm (BP Cuff), sitting);   sCr: 1.05 mg/dL;  GFR: 59.00        Exams:     PHYSICAL EXAM:     GENERAL: vital signs recorded - well developed, well nourished;  no apparent distress;     NECK: carotid exam reveals no bruits;     RESPIRATORY: normal respiratory rate and pattern with no distress; normal breath sounds with no rales, rhonchi, wheezes or rubs;     CARDIOVASCULAR: normal rate; rhythm is regular;  no systolic murmur; no edema;     NEUROLOGIC: GROSSLY INTACT     PSYCHIATRIC:  appropriate affect and demeanor; normal speech pattern; grossly normal memory;         Assessment:         G43.109   Migraine with aura, not intractable, without status migrainosus       I10   Essential (primary) hypertension       Z79.890   Hormone replacement therapy (postmenopausal)           ORDERS:         Meds Prescribed:       [Refilled] estradioL 0.5 mg oral tablet [1 tab daily PO], #90 (ninety) tablets, Refills: 1 (one)       [Refilled] amitriptyline 25 mg oral tablet [Take 1 tablet(s) by mouth at bedtime  prn], #90 (ninety) tablets, Refills: 1 (one)       [Refilled] lisinopriL-hydrochlorothiazide 20-12.5 mg oral tablet [take 1 tablet by oral route once daily], #90 (ninety) tablets, Refills: 1 (one)         Radiology/Test Orders:       [id number]  Screening digital breast tomosynthesis bi  (Send-Out)              Lab Orders:       [id number]  [facility] Basic Metabolic Panel  (Send-Out)                      Plan:         Migraine with aura, not intractable, without status migrainosushad flu vaccine through work  / to take rx as needed          Prescriptions:       [Refilled] amitriptyline 25 mg oral tablet [Take 1 tablet(s) by mouth at bedtime  prn], #90 (ninety) tablets, Refills: 1 (one)         Essential (primary) hypertensionreturn fasting for a lipid/ CMP panel when she gets her mammogram in June     LABORATORY:  Labs ordered to be performed today include basic metabolic panel.      FOLLOW-UP: pending labs           Prescriptions:       [Refilled]  lisinopriL-hydrochlorothiazide 20-12.5 mg oral tablet [take 1 tablet by oral route once daily], #90 (ninety) tablets, Refills: 1 (one)           Orders:       [id number]  [facility] Basic Metabolic Panel  (Send-Out)              Hormone replacement therapy (postmenopausal)will set up mammogram for June 2020; reviewed risk vs benefit of HRT, wants to continue rx, takes QOD at that works for her         FOLLOW-UP TESTING #1:    RADIOLOGY:  I have ordered Screening 3D Mammogram Bilateral to be done today.            Prescriptions:       [Refilled] estradioL 0.5 mg oral tablet [1 tab daily PO], #90 (ninety) tablets, Refills: 1 (one) but only taking QOD          Orders:       [id number]  Screening digital breast tomosynthesis bi  (Send-Out)                  Charge Capture:         Primary Diagnosis:     G43.109  Migraine with aura, not intractable, without status migrainosus           Orders:      [id number]  Office/outpatient visit; established patient, level 4  (In-House)              I10  Essential (primary) hypertension     Z79.890  Hormone replacement therapy (postmenopausal)

## 2021-05-18 NOTE — PROGRESS NOTES
"Karen Woodward 1965     Office/Outpatient Visit    Visit Date: Tue, Apr 30, 2019 06:51 pm    Provider: Michelle Dunlap N.P. (Assistant: Nakita Yap MA)    Location: [facility]        Electronically signed by Michelle Dunlap N.P. on  05/01/2019 12:23:59 AM                             SUBJECTIVE:        CC:     Ms. Woodward is a 53 year old Black or  female.  She presents with itchy throat, drainage &  dry eyes started Friday. \"pulled left side\" when picking up impok. Discuss measles.          HPI:         Patient to be evaluated for upper respiratory illness.  The symptoms include headache, nasal discharge and sneezing.  She denies ear complaints or fever.  She denies exposure to ill contacts.  She has not tried any medications for symptomatic relief.  Pertinent medical history is unremarkable.          does have an acute pulling in her left mid back - pain with movement and going from sitting to standing.  She does report that heat did make it feel better.  Thinks it was when she was bent over and picked up the grandbaby about 2-3 days ago         Ms. Woodward will be traveling soon and would like to make sure she is good on her measles vaccine.  She did have her vaccines as a child and wants to make sure she still has ample coverage     ROS:     CONSTITUTIONAL:  Negative for chills, fatigue, fever, and weight change.      EYES:  Positive for dry eyes and itching.      E/N/T:  Positive for ear pain, nasal congestion and sinus pressure.      CARDIOVASCULAR:  Negative for chest pain, orthopnea, paroxysmal nocturnal dyspnea and pedal edema.      RESPIRATORY:  Positive for recent cough.   Negative for dyspnea.      MUSCULOSKELETAL:  Positive for back pain ( acute ).      NEUROLOGICAL:  Negative for headaches, paresthesias and weakness.      ALLERGIC/IMMUNOLOGIC:  Negative for seasonal allergies.          PMH/FMH/SH:     Last Reviewed on 2/11/2019 05:24 PM by Flaquita Castro " KATIA    Past Medical History:                 PAST MEDICAL HISTORY         Hypertension     Endometriosis     wears hearing aids/bilateral         GYNECOLOGICAL HISTORY:     miscarriage 1    Last mammogram was 19         PREVENTIVE HEALTH MAINTENANCE             COLORECTAL CANCER SCREENING: Up to date (colonoscopy q10y; sigmoidoscopy q5y; Cologuard q3y) was last done , Results are in chart     MAMMOGRAM: Done within last 2 years and results in are chart was last done 02- stable         Surgical History:         Hysterectomy: TAHBSO;     tympanoplasty L ear with hearing loss;     Procedures:    Colonoscopy ( 17, hemorrhoids, Dr. KATIA Vigil )         Family History:     Father:  at age 50's; Cause of death was MI     Mother: Hypertension;  Hyperthyroidism     Maternal Grandmother: Hypertension         Social History:     Occupation: [employer] dept     Marital Status:      Children: 2 children         Tobacco/Alcohol/Supplements:     Last Reviewed on 2019 06:53 PM by Nakita Yap    Tobacco: She has never smoked.          Alcohol:  Does not drink alcohol and never has.          Substance Abuse History:     Last Reviewed on 2019 04:04 PM by Serena Castro        Mental Health History:     Last Reviewed on 2019 04:04 PM by Serena Castro        Communicable Diseases (eg STDs):     Last Reviewed on 2019 04:04 PM by Serena Castro            Current Problems:     Last Reviewed on 2019 04:04 PM by Serena Castro    High-risk sexual behavior     Hormone replacement therapy (postmenopausal)     Classic migraine     Hypertension     Low back strain     Screening test for viral disease - unspecified     Upper respiratory illness     Seasonal allergies         Immunizations:     zzFluzone pf-quadrivalent 3 and up 11/10/2016     Fluzone (3 + years dose) 10/30/2018         Allergies:     Last Reviewed on 2019 06:53 PM by Nakita Yap     Premarin: rash        Current Medications:     Last Reviewed on 4/30/2019 06:53 PM by Nakita Yap    Amitriptyline HCl 25mg Tablet Take 1 tablet(s) by mouth at bedtime  prn     Estradiol 0.5mg Tablet 1 tab daily PO     Lisinopril/Hydrochlorothiazide 10mg/12.5mg Tablet Take  one half  pill daily         OBJECTIVE:        Vitals:         Current: 4/30/2019 6:55:21 PM    Ht:  5 ft, 5.5 in;  Wt: 126.6 lbs;  BMI: 20.7    T: 98.5 F (oral);  BP: 145/75 mm Hg (right arm, sitting);  P: 74 bpm (right arm (BP Cuff), sitting);  sCr: 0.99 mg/dL;  GFR: 62.31        Exams:     PHYSICAL EXAM:     GENERAL: vital signs recorded - well developed, well nourished;  no apparent distress;     E/N/T: EARS: external auditory canal erythematous;  bilateral TMs are normal;     NECK: range of motion is normal; thyroid is non-palpable;     RESPIRATORY: normal respiratory rate and pattern with no distress; normal breath sounds with no rales, rhonchi, wheezes or rubs;     CARDIOVASCULAR: normal rate; rhythm is regular;  no systolic murmur; no edema;     LYMPHATIC: no enlargement of cervical or facial nodes; no supraclavicular nodes;     MUSCULOSKELETAL: normal gait; pain with range of motion in: back lateral flexion;     NEUROLOGICAL:  cranial nerves, motor and sensory function, reflexes, gait and coordination are all intact;     PSYCHIATRIC:  appropriate affect and demeanor; normal speech pattern; grossly normal memory;         ASSESSMENT:           465.8   J30.9  Upper respiratory illness              DDx:     V73.99   Z11.59  Screening test for viral disease - unspecified              DDx:     847.2   S39.012A  Low back strain              DDx:         ORDERS:         Meds Prescribed:       Diclofenac Sodium 75mg Tablets, Enteric Coated 1 tab bid with food  #30 (Thirty) tablet(s) Refills: 2       Cyclobenzaprine HCl 10mg Tablet 1 tablet AT HS PRN  #20 (Twenty) tablet(s) Refills: 0       Fluticasone Propionate 50mcg/1actuation Nasal Spray  1 spray each nostil daily  #1 (One) gm Refills: 2         Lab Orders:       [id number]  [facility] - [facility] Antibody IgG; Measles  (Send-Out)                   PLAN:          Upper respiratory illness           Prescriptions:       Fluticasone Propionate 50mcg/1actuation Nasal Spray 1 spray each nostil daily  #1 (One) gm Refills: 2          Screening test for viral disease - unspecified will check titer, if not adequate- will get booster     LABORATORY:  Labs ordered to be performed today include MMR Screening Rubeola.            Orders:       [id number]  [facility] - [facility] Antibody IgG; Measles  (Send-Out)            Low back strain           Prescriptions:       Cyclobenzaprine HCl 10mg Tablet 1 tablet AT HS PRN  #20 (Twenty) tablet(s) Refills: 0       Diclofenac Sodium 75mg Tablets, Enteric Coated 1 tab bid with food  #30 (Thirty) tablet(s) Refills: 2             CHARGE CAPTURE:           Primary Diagnosis:     465.8 Upper respiratory illness            J30.9    Allergic rhinitis, unspecified              Orders:          [id number]   Office/outpatient visit; established patient, level 3  (In-House)           V73.99 Screening test for viral disease - unspecified            Z11.59    Encounter for screening for other viral diseases    847.2 Low back strain            S39.012A    Strain of muscle, fascia and tendon of lower back, initial encounter

## 2021-07-01 VITALS
WEIGHT: 129.2 LBS | HEIGHT: 66 IN | TEMPERATURE: 98.3 F | DIASTOLIC BLOOD PRESSURE: 77 MMHG | SYSTOLIC BLOOD PRESSURE: 124 MMHG | BODY MASS INDEX: 20.76 KG/M2 | HEART RATE: 71 BPM

## 2021-07-01 VITALS
DIASTOLIC BLOOD PRESSURE: 85 MMHG | TEMPERATURE: 98.1 F | HEART RATE: 86 BPM | WEIGHT: 129 LBS | HEIGHT: 66 IN | BODY MASS INDEX: 20.73 KG/M2 | SYSTOLIC BLOOD PRESSURE: 129 MMHG

## 2021-07-01 VITALS
DIASTOLIC BLOOD PRESSURE: 75 MMHG | HEART RATE: 74 BPM | SYSTOLIC BLOOD PRESSURE: 145 MMHG | HEIGHT: 66 IN | BODY MASS INDEX: 20.34 KG/M2 | TEMPERATURE: 98.5 F | WEIGHT: 126.6 LBS

## 2021-07-01 VITALS
SYSTOLIC BLOOD PRESSURE: 144 MMHG | HEART RATE: 77 BPM | TEMPERATURE: 98.2 F | WEIGHT: 132.8 LBS | HEIGHT: 66 IN | BODY MASS INDEX: 21.34 KG/M2 | DIASTOLIC BLOOD PRESSURE: 84 MMHG

## 2021-07-01 VITALS
DIASTOLIC BLOOD PRESSURE: 90 MMHG | TEMPERATURE: 98.1 F | HEART RATE: 80 BPM | HEIGHT: 66 IN | SYSTOLIC BLOOD PRESSURE: 147 MMHG | BODY MASS INDEX: 21.66 KG/M2 | WEIGHT: 134.8 LBS

## 2021-07-01 VITALS
HEART RATE: 77 BPM | TEMPERATURE: 98.3 F | WEIGHT: 131.8 LBS | BODY MASS INDEX: 21.18 KG/M2 | SYSTOLIC BLOOD PRESSURE: 154 MMHG | DIASTOLIC BLOOD PRESSURE: 91 MMHG | HEIGHT: 66 IN

## 2021-07-02 VITALS
WEIGHT: 134.2 LBS | TEMPERATURE: 98.7 F | DIASTOLIC BLOOD PRESSURE: 98 MMHG | HEIGHT: 66 IN | BODY MASS INDEX: 21.57 KG/M2 | SYSTOLIC BLOOD PRESSURE: 139 MMHG | HEART RATE: 77 BPM

## 2021-07-02 VITALS
WEIGHT: 134.8 LBS | DIASTOLIC BLOOD PRESSURE: 83 MMHG | BODY MASS INDEX: 21.66 KG/M2 | HEART RATE: 78 BPM | SYSTOLIC BLOOD PRESSURE: 132 MMHG | HEIGHT: 66 IN | TEMPERATURE: 97.1 F

## 2021-07-02 VITALS
BODY MASS INDEX: 21.5 KG/M2 | WEIGHT: 133.8 LBS | HEIGHT: 66 IN | TEMPERATURE: 98.4 F | HEART RATE: 74 BPM | SYSTOLIC BLOOD PRESSURE: 131 MMHG | DIASTOLIC BLOOD PRESSURE: 79 MMHG

## 2021-07-02 VITALS
TEMPERATURE: 97.6 F | BODY MASS INDEX: 21.21 KG/M2 | SYSTOLIC BLOOD PRESSURE: 134 MMHG | HEART RATE: 83 BPM | WEIGHT: 132 LBS | DIASTOLIC BLOOD PRESSURE: 89 MMHG | HEIGHT: 66 IN

## 2021-07-26 ENCOUNTER — LAB (OUTPATIENT)
Dept: LAB | Facility: HOSPITAL | Age: 56
End: 2021-07-26

## 2021-07-26 ENCOUNTER — OFFICE VISIT (OUTPATIENT)
Dept: FAMILY MEDICINE CLINIC | Age: 56
End: 2021-07-26

## 2021-07-26 VITALS
WEIGHT: 133.8 LBS | DIASTOLIC BLOOD PRESSURE: 80 MMHG | BODY MASS INDEX: 21.93 KG/M2 | TEMPERATURE: 98.3 F | HEART RATE: 80 BPM | SYSTOLIC BLOOD PRESSURE: 120 MMHG

## 2021-07-26 DIAGNOSIS — I10 ESSENTIAL (PRIMARY) HYPERTENSION: ICD-10-CM

## 2021-07-26 DIAGNOSIS — D17.0 BENIGN LIPOMATOUS NEOPLASM OF SKIN AND SUBCUTANEOUS TISSUE OF HEAD, FACE AND NECK: ICD-10-CM

## 2021-07-26 DIAGNOSIS — G43.119 MIGRAINE WITH AURA, INTRACTABLE, WITHOUT STATUS MIGRAINOSUS: ICD-10-CM

## 2021-07-26 DIAGNOSIS — I10 ESSENTIAL (PRIMARY) HYPERTENSION: Primary | ICD-10-CM

## 2021-07-26 DIAGNOSIS — Z79.890 HORMONE REPLACEMENT THERAPY (POSTMENOPAUSAL): ICD-10-CM

## 2021-07-26 LAB
ANION GAP SERPL CALCULATED.3IONS-SCNC: 9.9 MMOL/L (ref 5–15)
BUN SERPL-MCNC: 16 MG/DL (ref 6–20)
BUN/CREAT SERPL: 13.6 (ref 7–25)
CALCIUM SPEC-SCNC: 9.4 MG/DL (ref 8.6–10.5)
CHLORIDE SERPL-SCNC: 100 MMOL/L (ref 98–107)
CO2 SERPL-SCNC: 30.1 MMOL/L (ref 22–29)
CREAT SERPL-MCNC: 1.18 MG/DL (ref 0.57–1)
GFR SERPL CREATININE-BSD FRML MDRD: 58 ML/MIN/1.73
GLUCOSE SERPL-MCNC: 113 MG/DL (ref 65–99)
POTASSIUM SERPL-SCNC: 4.1 MMOL/L (ref 3.5–5.2)
SODIUM SERPL-SCNC: 140 MMOL/L (ref 136–145)

## 2021-07-26 PROCEDURE — 80048 BASIC METABOLIC PNL TOTAL CA: CPT

## 2021-07-26 PROCEDURE — 36415 COLL VENOUS BLD VENIPUNCTURE: CPT

## 2021-07-26 PROCEDURE — 99214 OFFICE O/P EST MOD 30 MIN: CPT | Performed by: NURSE PRACTITIONER

## 2021-07-26 RX ORDER — AMITRIPTYLINE HYDROCHLORIDE 25 MG/1
25 TABLET, FILM COATED ORAL NIGHTLY PRN
COMMUNITY
End: 2021-07-26 | Stop reason: SDUPTHER

## 2021-07-26 RX ORDER — ESTRADIOL 0.5 MG/1
0.5 TABLET ORAL DAILY
Qty: 90 TABLET | Refills: 0 | Status: SHIPPED | OUTPATIENT
Start: 2021-07-26 | End: 2022-04-18 | Stop reason: SDUPTHER

## 2021-07-26 RX ORDER — LISINOPRIL 20 MG/1
20 TABLET ORAL DAILY
Qty: 90 TABLET | Refills: 0 | Status: SHIPPED | OUTPATIENT
Start: 2021-07-26 | End: 2021-10-06 | Stop reason: DRUGHIGH

## 2021-07-26 RX ORDER — LISINOPRIL AND HYDROCHLOROTHIAZIDE 20; 12.5 MG/1; MG/1
1 TABLET ORAL DAILY
COMMUNITY
End: 2021-07-26 | Stop reason: ALTCHOICE

## 2021-07-26 RX ORDER — AMITRIPTYLINE HYDROCHLORIDE 25 MG/1
25 TABLET, FILM COATED ORAL NIGHTLY PRN
Qty: 90 TABLET | Refills: 1 | Status: SHIPPED | OUTPATIENT
Start: 2021-07-26 | End: 2022-04-18 | Stop reason: SDUPTHER

## 2021-07-26 RX ORDER — ESTRADIOL 0.5 MG/1
0.5 TABLET ORAL DAILY
COMMUNITY
End: 2021-07-26 | Stop reason: SDUPTHER

## 2021-07-26 NOTE — PROGRESS NOTES
Karen Woodward presents to [facility].    Chief Complaint:  Hypertension (6 month )         History of Present Illness:  Hypertension:  Current medication Lisinopril/HCTZ  Tolerating Medication: fair, but having urinary frequency even nocturia no burning (worried about her kidney function)   Checking BP at home and it is /not checking   Needs refills: Yes  Labs   Lab Results       Component                Value               Date                       BUN                      18                  01/26/2021                 CREATININE               1.15 (H)            01/26/2021                 BCR                      16                  01/26/2021                 K                        3.7                 01/26/2021                 CO2                      30                  01/26/2021                 CALCIUM                  9.2                 01/26/2021              Hx of migraine's takes at HS, elavil that helps     Hx of postmenopausal, takes rx QOD, needs her mammogram scheduled, checks BSE regularly, no masses          Review of Systems:  Review of Systems   Constitutional: Negative for fatigue and fever.   Respiratory: Negative for cough and shortness of breath.    Cardiovascular: Negative for chest pain, palpitations and leg swelling.   Skin:        Lipoma on right upper back, wants it removed / had for 10 years.    Neurological: Negative for numbness.          Vital Signs:   /80 (BP Location: Left arm, Patient Position: Sitting, Cuff Size: Adult)   Pulse 80   Temp 98.3 °F (36.8 °C) (Oral)   Wt 60.7 kg (133 lb 12.8 oz)   BMI 21.93 kg/m²       Physical Exam:  Physical Exam  Vitals reviewed.   Constitutional:       General: She is not in acute distress.     Appearance: Normal appearance.   Neck:      Vascular: No carotid bruit.   Cardiovascular:      Rate and Rhythm: Normal rate and regular rhythm.      Heart sounds: Normal heart sounds. No murmur heard.      Pulmonary:      Effort: Pulmonary effort is normal. No respiratory distress.      Breath sounds: Normal breath sounds.   Musculoskeletal:      Right lower leg: No edema.      Left lower leg: No edema.   Skin:     Comments: Lipoma right upper back    Neurological:      General: No focal deficit present.      Mental Status: She is alert.   Psychiatric:         Mood and Affect: Mood normal.         Behavior: Behavior normal.         Result Review      The following data was reviewed by: ADI De La O on 07/26/2021:    No results found for this or any previous visit.            Assessment and Plan:          Diagnoses and all orders for this visit:    1. Essential (primary) hypertension (Primary)  Assessment & Plan:  Stopping hctz, switching to just the ACE, monitor BP, checking lab today    Orders:  -     Basic Metabolic Panel; Future  -     lisinopril (PRINIVIL,ZESTRIL) 20 MG tablet; Take 1 tablet by mouth Daily.  Dispense: 90 tablet; Refill: 0    2. Hormone replacement therapy (postmenopausal)  Assessment & Plan:  Reviewed risk vs benefit of HRT, wants to continue, set up mammogram, continue BSE [facility]     Orders:  -     Mammo Screening Digital Tomosynthesis Bilateral With CAD; Future  -     estradiol (ESTRACE) 0.5 MG tablet; Take 1 tablet by mouth Daily.  Dispense: 90 tablet; Refill: 0    3. Migraine with aura, intractable, without status migrainosus  -     amitriptyline (ELAVIL) 25 MG tablet; Take 1 tablet by mouth At Night As Needed for Sleep.  Dispense: 90 tablet; Refill: 1    4. 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  Patient was given instructions and counseling regarding her condition or for health maintenance advice. Please see specific information pulled into the AVS if appropriate.

## 2021-07-28 DIAGNOSIS — R94.4 ABNORMAL KIDNEY FUNCTION STUDY: Primary | ICD-10-CM

## 2021-08-18 ENCOUNTER — HOSPITAL ENCOUNTER (OUTPATIENT)
Dept: MAMMOGRAPHY | Facility: HOSPITAL | Age: 56
Discharge: HOME | End: 2021-08-18
Admitting: NURSE PRACTITIONER

## 2021-08-18 DIAGNOSIS — Z79.890 HORMONE REPLACEMENT THERAPY (POSTMENOPAUSAL): ICD-10-CM

## 2021-08-18 PROCEDURE — 77063 BREAST TOMOSYNTHESIS BI: CPT

## 2021-08-18 PROCEDURE — 77067 SCR MAMMO BI INCL CAD: CPT

## 2021-10-06 DIAGNOSIS — I10 ESSENTIAL (PRIMARY) HYPERTENSION: Primary | ICD-10-CM

## 2021-10-06 RX ORDER — LISINOPRIL AND HYDROCHLOROTHIAZIDE 20; 12.5 MG/1; MG/1
1 TABLET ORAL DAILY
Qty: 90 TABLET | Refills: 1 | Status: SHIPPED | OUTPATIENT
Start: 2021-10-06 | End: 2022-01-12 | Stop reason: SDUPTHER

## 2021-10-06 RX ORDER — LISINOPRIL AND HYDROCHLOROTHIAZIDE 20; 12.5 MG/1; MG/1
1 TABLET ORAL DAILY
COMMUNITY
End: 2021-10-06 | Stop reason: SDUPTHER

## 2021-10-06 NOTE — TELEPHONE ENCOUNTER
Pt said when she was in the last time to see A Castro she had told her that the Lisinopril/ hctz 20/12.5mg was making her go to the bathroom too much. She had ask her to give her the plain Lisinopril 20mg daily and she did, but her b/p has been elevated and she wants the combo pill back.

## 2021-10-06 NOTE — TELEPHONE ENCOUNTER
Caller: Karen Woodward    Relationship: Self      Medication requested (name and dosage): t    PATIENT STATES THAT IT IS HER OLD PRESCRIPTION THE LISINOPRIL HCTZ 20-12.5 MG TABLETS AND TAKES ONE TABLET ONCE A DAY    Pharmacy where request should be sent:   [facility] - [place], [place] - [address]  - [phone]  - [phone] FX            Additional details provided by patient: PATIENT HAS SOME FOR THE REST OF THE WEEK AND WILL BE OUT. NO REFILLS LEFT.     Best call back number: [phone]    Does the patient have less than a 3 day supply:  [x] Yes  [] No    Josh Olivier Rep   10/06/21 09:37 EDT

## 2021-10-20 ENCOUNTER — CLINICAL SUPPORT (OUTPATIENT)
Dept: FAMILY MEDICINE CLINIC | Age: 56
End: 2021-10-20

## 2021-10-20 DIAGNOSIS — Z23 NEED FOR INFLUENZA VACCINATION: Primary | ICD-10-CM

## 2021-10-20 PROCEDURE — 90686 IIV4 VACC NO PRSV 0.5 ML IM: CPT | Performed by: FAMILY MEDICINE

## 2021-10-20 PROCEDURE — 90471 IMMUNIZATION ADMIN: CPT | Performed by: FAMILY MEDICINE

## 2021-11-19 ENCOUNTER — LAB (OUTPATIENT)
Dept: LAB | Facility: HOSPITAL | Age: 56
End: 2021-11-19

## 2021-11-19 DIAGNOSIS — R94.4 ABNORMAL KIDNEY FUNCTION STUDY: ICD-10-CM

## 2021-11-19 LAB
ANION GAP SERPL CALCULATED.3IONS-SCNC: 12.3 MMOL/L (ref 5–15)
BUN SERPL-MCNC: 14 MG/DL (ref 6–20)
BUN/CREAT SERPL: 15.1 (ref 7–25)
CALCIUM SPEC-SCNC: 9.2 MG/DL (ref 8.6–10.5)
CHLORIDE SERPL-SCNC: 99 MMOL/L (ref 98–107)
CO2 SERPL-SCNC: 28.7 MMOL/L (ref 22–29)
CREAT SERPL-MCNC: 0.93 MG/DL (ref 0.57–1)
GFR SERPL CREATININE-BSD FRML MDRD: 76 ML/MIN/1.73
GLUCOSE SERPL-MCNC: 107 MG/DL (ref 65–99)
POTASSIUM SERPL-SCNC: 3.6 MMOL/L (ref 3.5–5.2)
SODIUM SERPL-SCNC: 140 MMOL/L (ref 136–145)

## 2021-11-19 PROCEDURE — 36415 COLL VENOUS BLD VENIPUNCTURE: CPT

## 2021-11-19 PROCEDURE — 80048 BASIC METABOLIC PNL TOTAL CA: CPT

## 2022-01-12 ENCOUNTER — TELEPHONE (OUTPATIENT)
Dept: FAMILY MEDICINE CLINIC | Age: 57
End: 2022-01-12

## 2022-01-12 DIAGNOSIS — I10 ESSENTIAL (PRIMARY) HYPERTENSION: ICD-10-CM

## 2022-01-12 RX ORDER — LISINOPRIL AND HYDROCHLOROTHIAZIDE 20; 12.5 MG/1; MG/1
1 TABLET ORAL DAILY
Qty: 90 TABLET | Refills: 0 | Status: SHIPPED | OUTPATIENT
Start: 2022-01-12 | End: 2022-04-18 | Stop reason: SDUPTHER

## 2022-01-12 NOTE — TELEPHONE ENCOUNTER
Pt said she has a hemorroid and she noticied blood on her tissue Monday and Tuesday. She was able to see it with a mirror. Has been doing epison salt soaks and it seems to have shrunk it down and she has no longer seen blood. She will continue her soaks and  some prep H cream. Appt 01/26/22 with A Castro. If she thinks it is getting worse or it starts to bleed again she will call back.

## 2022-01-12 NOTE — TELEPHONE ENCOUNTER
Rx Refill Note  Requested Prescriptions     Signed Prescriptions Disp Refills   • lisinopril-hydrochlorothiazide (PRINZIDE,ZESTORETIC) 20-12.5 MG per tablet 90 tablet 0     Sig: Take 1 tablet by mouth Daily.     Authorizing Provider: SAKINA TAMAYO     Ordering User: MARISSA RIVERA      Last office visit with prescribing clinician: 7/26/2021      Next office visit with prescribing clinician: 1/26/2022   Lab: Basic metabolic panel (11/19/2021 10:24)    Germania Rivera LPN  01/12/22, 16:11 EST

## 2022-01-12 NOTE — TELEPHONE ENCOUNTER
Caller: Karen Woodward    Relationship to patient: Self    Best call back number: [phone]    Chief complaint: POSSIBLE HEMORRHOID DUE TO BLOOD WHEN SHE WIPED ON 01/10/2022 AND ON 01/11/2022 , ALSO MED REFILLS     Type of visit: OFFICE VISIT     Requested date: 01/13/2022 OR 01/14/2022     If rescheduling, when is the original appointment 01/26/2022     Additional notes PATIENT WANTS TO BE SEEN SOONER DUE TO HER ISSUES       HUB ATTEMPTED TO SCHEDULE PATIENT AS REQUESTED BUT HAD NO AVAILABILITY WITH PCP OR APC FOR DATES REQUESTED.

## 2022-04-18 ENCOUNTER — OFFICE VISIT (OUTPATIENT)
Dept: FAMILY MEDICINE CLINIC | Age: 57
End: 2022-04-18

## 2022-04-18 ENCOUNTER — LAB (OUTPATIENT)
Dept: LAB | Facility: HOSPITAL | Age: 57
End: 2022-04-18

## 2022-04-18 VITALS
BODY MASS INDEX: 21.15 KG/M2 | SYSTOLIC BLOOD PRESSURE: 129 MMHG | HEART RATE: 77 BPM | HEIGHT: 66 IN | WEIGHT: 131.6 LBS | DIASTOLIC BLOOD PRESSURE: 81 MMHG

## 2022-04-18 DIAGNOSIS — I10 ESSENTIAL (PRIMARY) HYPERTENSION: ICD-10-CM

## 2022-04-18 DIAGNOSIS — G43.119 MIGRAINE WITH AURA, INTRACTABLE, WITHOUT STATUS MIGRAINOSUS: ICD-10-CM

## 2022-04-18 DIAGNOSIS — I10 ESSENTIAL (PRIMARY) HYPERTENSION: Primary | ICD-10-CM

## 2022-04-18 DIAGNOSIS — Z79.890 HORMONE REPLACEMENT THERAPY (POSTMENOPAUSAL): ICD-10-CM

## 2022-04-18 PROCEDURE — 80048 BASIC METABOLIC PNL TOTAL CA: CPT

## 2022-04-18 PROCEDURE — 36415 COLL VENOUS BLD VENIPUNCTURE: CPT

## 2022-04-18 PROCEDURE — 99213 OFFICE O/P EST LOW 20 MIN: CPT | Performed by: NURSE PRACTITIONER

## 2022-04-18 RX ORDER — LISINOPRIL AND HYDROCHLOROTHIAZIDE 20; 12.5 MG/1; MG/1
1 TABLET ORAL DAILY
Qty: 90 TABLET | Refills: 1 | Status: SHIPPED | OUTPATIENT
Start: 2022-04-18 | End: 2022-09-15 | Stop reason: SINTOL

## 2022-04-18 RX ORDER — ESTRADIOL 0.5 MG/1
0.5 TABLET ORAL DAILY
Qty: 90 TABLET | Refills: 0 | Status: SHIPPED | OUTPATIENT
Start: 2022-04-18 | End: 2022-09-15 | Stop reason: SDUPTHER

## 2022-04-18 RX ORDER — AMITRIPTYLINE HYDROCHLORIDE 25 MG/1
25 TABLET, FILM COATED ORAL NIGHTLY PRN
Qty: 90 TABLET | Refills: 1 | Status: SHIPPED | OUTPATIENT
Start: 2022-04-18 | End: 2022-11-07 | Stop reason: SDUPTHER

## 2022-04-18 NOTE — PROGRESS NOTES
Karen Woodward presents to [facility].    Chief Complaint:  Hypertension (Med refills/) and Headache         History of Present Illness:  Hypertension:  Current medication: Lisinopril / HCTZ   Tolerating Medication: Yes but does have to urinate frequently   Checking BP at home and it is: 130's/ 70-80  Needs refills: Yes, [facility]   Labs:  Lab Results       Component                Value               Date                       GLUCOSE                  107 (H)             2021                 BUN                      14                  2021                 CREATININE               0.93                2021                 EGFRIFAFRI               76                  2021                 BCR                      15.1                2021                 K                        3.6                 2021                 CO2                      28.7                2021                 CALCIUM                  9.2                 2021              History of migraine's, takes Elavil, works well, takes prn, weather changes cause increase hedaches.     Postmenopausal on HRT/ last mammogram ; benign  Takes estridiol QOD, tolerates rx (has hot flashes and night sweats if she does not take )    Was exposed to Covid in early April but she tested negative     PAST MEDICAL HISTORY changes since : none         Hypertension     Endometriosis     wears hearing aids/bilateral         GYNECOLOGICAL HISTORY:     miscarriage 1         PREVENTIVE HEALTH MAINTENANCE             COLORECTAL CANCER SCREENING: Up to date (colonoscopy q10y; sigmoidoscopy q5y; Cologuard q3y) was last done , Results are in chart     MAMMOGRAM: Done within last 2 years and results in are chart was last done 20 with normal results     PAP SMEAR: no longer due to history/ hysterectomy         Surgical History:         Hysterectomy: TAHBSO;     tympanoplasty L  "ear with hearing loss;         Family History:     Father:  at age 50's; Cause of death was MI     Mother: Hypertension;  Hyperthyroidism     Brother(s): 1 brother(s) total;  Hypertension     Son(s): Healthy; 1 son(s) total     Daughter(s): 1 daughter(s) total     Maternal Grandmother: Hypertension         Social History:     Occupation: [employer]  ([place])  works from home / started     Marital Status:      Children: 2 children             Review of Systems:  Review of Systems   Constitutional: Negative for fatigue and fever.   Respiratory: Negative for cough and shortness of breath.    Cardiovascular: Negative for chest pain, palpitations and leg swelling.   Genitourinary: Negative for dysuria.   Neurological: Negative for numbness.          Current Outpatient Medications:   •  amitriptyline (ELAVIL) 25 MG tablet, Take 1 tablet by mouth At Night As Needed for Sleep., Disp: 90 tablet, Rfl: 1  •  estradiol (ESTRACE) 0.5 MG tablet, Take 1 tablet by mouth Daily., Disp: 90 tablet, Rfl: 0  •  lisinopril-hydrochlorothiazide (PRINZIDE,ZESTORETIC) 20-12.5 MG per tablet, Take 1 tablet by mouth Daily., Disp: 90 tablet, Rfl: 1    Vital Signs:   Vitals:    22 1627   BP: 129/81   BP Location: Right arm   Patient Position: Sitting   Pulse: 77   Weight: 59.7 kg (131 lb 9.6 oz)   Height: 166.4 cm (65.5\")         Physical Exam:  Physical Exam  Vitals reviewed.   Constitutional:       General: She is not in acute distress.     Appearance: Normal appearance.   Neck:      Vascular: No carotid bruit.   Cardiovascular:      Rate and Rhythm: Normal rate and regular rhythm.      Heart sounds: Normal heart sounds. No murmur heard.  Pulmonary:      Effort: Pulmonary effort is normal. No respiratory distress.      Breath sounds: Normal breath sounds.   Musculoskeletal:      Right lower leg: No edema.      Left lower leg: No edema.   Neurological:      General: No focal deficit present.      Mental Status: She is alert. "   Psychiatric:         Mood and Affect: Mood normal.         Behavior: Behavior normal.         Result Review      The following data was reviewed by: ADI De La O on 04/18/2022:    Results for orders placed or performed in visit on 11/19/21   Basic metabolic panel    Specimen: Blood   Result Value Ref Range    Glucose 107 (H) 65 - 99 mg/dL    BUN 14 6 - 20 mg/dL    Creatinine 0.93 0.57 - 1.00 mg/dL    Sodium 140 136 - 145 mmol/L    Potassium 3.6 3.5 - 5.2 mmol/L    Chloride 99 98 - 107 mmol/L    CO2 28.7 22.0 - 29.0 mmol/L    Calcium 9.2 8.6 - 10.5 mg/dL    eGFR  African Amer 76 >60 mL/min/1.73    BUN/Creatinine Ratio 15.1 7.0 - 25.0    Anion Gap 12.3 5.0 - 15.0 mmol/L               Assessment and Plan:          Diagnoses and all orders for this visit:    1. Essential (primary) hypertension (Primary)  Assessment & Plan:  Stay well hydrated, rechecking BMP, would consider switching to HCTZ 12.5 and take a half and continue ACE 20 or stop diuretic and increase to 30 mg, continue to monitor her bp     Orders:  -     Basic metabolic panel; Future  -     lisinopril-hydrochlorothiazide (PRINZIDE,ZESTORETIC) 20-12.5 MG per tablet; Take 1 tablet by mouth Daily.  Dispense: 90 tablet; Refill: 1    2. Hormone replacement therapy (postmenopausal)  Assessment & Plan:  Risk vs benefit of HRT discussed, she is taking every other day, mammogram normal, continues BSE, no masses    Orders:  -     estradiol (ESTRACE) 0.5 MG tablet; Take 1 tablet by mouth Daily.  Dispense: 90 tablet; Refill: 0    3. Migraine with aura, intractable, without status migrainosus  Assessment & Plan:  Refilled Elavil, only takes prn       Orders:  -     amitriptyline (ELAVIL) 25 MG tablet; Take 1 tablet by mouth At Night As Needed for Sleep.  Dispense: 90 tablet; Refill: 1        Follow Up   Return for followup pending lab results.  Patient was given instructions and counseling regarding her condition or for health maintenance advice. Please  see specific information pulled into the AVS if appropriate.

## 2022-04-18 NOTE — ASSESSMENT & PLAN NOTE
Stay well hydrated, rechecking BMP, would consider switching to HCTZ 12.5 and take a half and continue ACE 20 or stop diuretic and increase to 30 mg, continue to monitor her bp

## 2022-04-18 NOTE — ASSESSMENT & PLAN NOTE
Risk vs benefit of HRT discussed, she is taking every other day, mammogram normal, continues BSE, no masses

## 2022-04-19 LAB
ANION GAP SERPL CALCULATED.3IONS-SCNC: 11.3 MMOL/L (ref 5–15)
BUN SERPL-MCNC: 13 MG/DL (ref 6–20)
BUN/CREAT SERPL: 13.1 (ref 7–25)
CALCIUM SPEC-SCNC: 9.6 MG/DL (ref 8.6–10.5)
CHLORIDE SERPL-SCNC: 102 MMOL/L (ref 98–107)
CO2 SERPL-SCNC: 29.7 MMOL/L (ref 22–29)
CREAT SERPL-MCNC: 0.99 MG/DL (ref 0.57–1)
EGFRCR SERPLBLD CKD-EPI 2021: 67.5 ML/MIN/1.73
GLUCOSE SERPL-MCNC: 103 MG/DL (ref 65–99)
POTASSIUM SERPL-SCNC: 4 MMOL/L (ref 3.5–5.2)
SODIUM SERPL-SCNC: 143 MMOL/L (ref 136–145)

## 2022-09-15 ENCOUNTER — OFFICE VISIT (OUTPATIENT)
Dept: FAMILY MEDICINE CLINIC | Age: 57
End: 2022-09-15

## 2022-09-15 VITALS
WEIGHT: 130 LBS | TEMPERATURE: 98.3 F | HEIGHT: 66 IN | BODY MASS INDEX: 20.89 KG/M2 | HEART RATE: 61 BPM | OXYGEN SATURATION: 97 % | DIASTOLIC BLOOD PRESSURE: 86 MMHG | SYSTOLIC BLOOD PRESSURE: 142 MMHG

## 2022-09-15 DIAGNOSIS — I10 ESSENTIAL (PRIMARY) HYPERTENSION: Primary | ICD-10-CM

## 2022-09-15 DIAGNOSIS — J01.00 ACUTE MAXILLARY SINUSITIS, RECURRENCE NOT SPECIFIED: ICD-10-CM

## 2022-09-15 DIAGNOSIS — Z12.31 ENCOUNTER FOR SCREENING MAMMOGRAM FOR MALIGNANT NEOPLASM OF BREAST: ICD-10-CM

## 2022-09-15 DIAGNOSIS — Z79.890 HORMONE REPLACEMENT THERAPY (POSTMENOPAUSAL): ICD-10-CM

## 2022-09-15 PROCEDURE — 99214 OFFICE O/P EST MOD 30 MIN: CPT | Performed by: NURSE PRACTITIONER

## 2022-09-15 RX ORDER — ESTRADIOL 0.5 MG/1
0.5 TABLET ORAL DAILY
Qty: 30 TABLET | Refills: 0 | Status: SHIPPED | OUTPATIENT
Start: 2022-09-15 | End: 2022-11-07 | Stop reason: SDUPTHER

## 2022-09-15 RX ORDER — LISINOPRIL 20 MG/1
20 TABLET ORAL DAILY
Qty: 30 TABLET | Refills: 2 | Status: SHIPPED | OUTPATIENT
Start: 2022-09-15 | End: 2022-11-07 | Stop reason: DRUGHIGH

## 2022-09-15 RX ORDER — CEFDINIR 300 MG/1
300 CAPSULE ORAL 2 TIMES DAILY
Qty: 20 CAPSULE | Refills: 0 | Status: SHIPPED | OUTPATIENT
Start: 2022-09-15 | End: 2022-11-07

## 2022-09-15 NOTE — ASSESSMENT & PLAN NOTE
Stop any decongestants  Rest, increase fluids, follow up if symptoms progress or change   Cover her with ATB

## 2022-09-15 NOTE — PROGRESS NOTES
Karen Vigil presents to [facility].    Chief Complaint:  Nasal Congestion         History of Present Illness:  URI  When did symptoms started: 5 days   Any exposures:none   Symptoms:nasal congested, green drainage now from her nose   Treatment tried: Mucinex, vicks   Tested X 2 negative at home for covid     Hypertension:  Current medication: lisinopril/hctz  Tolerating Medication: no, urinary freq with diuretic and wants to stop that    Checking BP at home and it is: no   Needs refills: yes, [facility]  Labs:  Lab Results       Component                Value               Date                       GLUCOSE                  103 (H)             2022                 BUN                      13                  2022                 CREATININE               0.99                2022                 EGFRIFAFRI               76                  2021                 BCR                      13.1                2022                 K                        4.0                 2022                 CO2                      29.7 (H)            2022                 CALCIUM                  9.6                 2022              PAST MEDICAL HISTORY changes since :         Hypertension     Endometriosis     wears hearing aids/bilateral         GYNECOLOGICAL HISTORY:     miscarriage 1         PREVENTIVE HEALTH MAINTENANCE             COLORECTAL CANCER SCREENING: Up to date (colonoscopy q10y; sigmoidoscopy q5y; Cologuard q3y) was last done -, Results are in chart     MAMMOGRAM: Done within last 2 years and results in are chart was last done 20 with normal results     PAP SMEAR: no longer due to history/ hysterectomy         Surgical History:         Hysterectomy: TAHBSO;     tympanoplasty L ear with hearing loss;         Family History:     Father:  at age 50's; Cause of death was MI     Mother: Hypertension;  Hyperthyroidism      "Brother(s): 1 brother(s) total;  Hypertension     Son(s): Healthy; 1 son(s) total     Daughter(s): 1 daughter(s) total     Maternal Grandmother: Hypertension         Social History:     Occupation: [employer]  ([place])  works from home / started 1-2022    Marital Status:  and now remrried     Children: 2 children              Review of Systems:  Review of Systems   Constitutional: Negative for fever.   HENT: Positive for postnasal drip.    Respiratory: Negative for shortness of breath.    Cardiovascular: Negative for chest pain.          Current Outpatient Medications:   •  amitriptyline (ELAVIL) 25 MG tablet, Take 1 tablet by mouth At Night As Needed for Sleep., Disp: 90 tablet, Rfl: 1  •  estradiol (ESTRACE) 0.5 MG tablet, Take 1 tablet by mouth Daily., Disp: 30 tablet, Rfl: 0  •  cefdinir (OMNICEF) 300 MG capsule, Take 1 capsule by mouth 2 (Two) Times a Day., Disp: 20 capsule, Rfl: 0  •  lisinopril (PRINIVIL,ZESTRIL) 20 MG tablet, Take 1 tablet by mouth Daily., Disp: 30 tablet, Rfl: 2    Vital Signs:   Vitals:    09/15/22 1432 09/15/22 1456   BP: 160/81 142/86   Pulse: 61    Temp: 98.3 °F (36.8 °C)    SpO2: 97%    Weight: 59 kg (130 lb)    Height: 166.4 cm (65.5\")          Physical Exam:  Physical Exam  Constitutional:       General: She is not in acute distress.     Appearance: Normal appearance.   HENT:      Right Ear: Tympanic membrane, ear canal and external ear normal.      Left Ear: Tympanic membrane, ear canal and external ear normal.      Nose:      Comments: Mild maxillary sinus tenderness       Mouth/Throat:      Pharynx: Oropharynx is clear. No posterior oropharyngeal erythema.   Cardiovascular:      Rate and Rhythm: Normal rate and regular rhythm.      Heart sounds: No murmur heard.  Pulmonary:      Effort: Pulmonary effort is normal.      Breath sounds: Normal breath sounds.   Lymphadenopathy:      Cervical: No cervical adenopathy.   Neurological:      Mental Status: She is alert. "   Psychiatric:         Mood and Affect: Mood normal.         Behavior: Behavior normal.         Result Review      The following data was reviewed by: ADI De La O on 09/15/2022:    Results for orders placed or performed in visit on 04/18/22   Basic metabolic panel    Specimen: Blood   Result Value Ref Range    Glucose 103 (H) 65 - 99 mg/dL    BUN 13 6 - 20 mg/dL    Creatinine 0.99 0.57 - 1.00 mg/dL    Sodium 143 136 - 145 mmol/L    Potassium 4.0 3.5 - 5.2 mmol/L    Chloride 102 98 - 107 mmol/L    CO2 29.7 (H) 22.0 - 29.0 mmol/L    Calcium 9.6 8.6 - 10.5 mg/dL    BUN/Creatinine Ratio 13.1 7.0 - 25.0    Anion Gap 11.3 5.0 - 15.0 mmol/L    eGFR 67.5 >60.0 mL/min/1.73               Assessment and Plan:          Diagnoses and all orders for this visit:    1. Essential (primary) hypertension (Primary)  Assessment & Plan:  Switched to just the ACE, to monitor her bp       2. Acute maxillary sinusitis, recurrence not specified  Assessment & Plan:  Stop any decongestants  Rest, increase fluids, follow up if symptoms progress or change   Cover her with ATB    Orders:  -     cefdinir (OMNICEF) 300 MG capsule; Take 1 capsule by mouth 2 (Two) Times a Day.  Dispense: 20 capsule; Refill: 0    3. Hormone replacement therapy (postmenopausal)  Assessment & Plan:  Requested a refill on her HRT, I reviewed her chart and she is past due her mammogram, will go ahead and schedule this     Orders:  -     estradiol (ESTRACE) 0.5 MG tablet; Take 1 tablet by mouth Daily.  Dispense: 30 tablet; Refill: 0    4. Encounter for screening mammogram for malignant neoplasm of breast  -     Mammo Screening Digital Tomosynthesis Bilateral With CAD; Future    Other orders  -     lisinopril (PRINIVIL,ZESTRIL) 20 MG tablet; Take 1 tablet by mouth Daily.  Dispense: 30 tablet; Refill: 2        Follow Up   Return if symptoms worsen or fail to improve.  Patient was given instructions and counseling regarding her condition or for health  maintenance advice. Please see specific information pulled into the AVS if appropriate.

## 2022-09-15 NOTE — ASSESSMENT & PLAN NOTE
Requested a refill on her HRT, I reviewed her chart and she is past due her mammogram, will go ahead and schedule this

## 2022-10-28 ENCOUNTER — HOSPITAL ENCOUNTER (OUTPATIENT)
Dept: MAMMOGRAPHY | Facility: HOSPITAL | Age: 57
Discharge: HOME | End: 2022-10-28
Admitting: NURSE PRACTITIONER

## 2022-10-28 DIAGNOSIS — Z12.31 ENCOUNTER FOR SCREENING MAMMOGRAM FOR MALIGNANT NEOPLASM OF BREAST: ICD-10-CM

## 2022-10-28 PROCEDURE — 77063 BREAST TOMOSYNTHESIS BI: CPT

## 2022-10-28 PROCEDURE — 77067 SCR MAMMO BI INCL CAD: CPT

## 2022-11-07 ENCOUNTER — LAB (OUTPATIENT)
Dept: LAB | Facility: HOSPITAL | Age: 57
End: 2022-11-07

## 2022-11-07 ENCOUNTER — OFFICE VISIT (OUTPATIENT)
Dept: FAMILY MEDICINE CLINIC | Age: 57
End: 2022-11-07

## 2022-11-07 VITALS
HEIGHT: 66 IN | SYSTOLIC BLOOD PRESSURE: 158 MMHG | DIASTOLIC BLOOD PRESSURE: 84 MMHG | WEIGHT: 134.4 LBS | BODY MASS INDEX: 21.6 KG/M2 | HEART RATE: 67 BPM

## 2022-11-07 DIAGNOSIS — Z79.890 HORMONE REPLACEMENT THERAPY (POSTMENOPAUSAL): Primary | ICD-10-CM

## 2022-11-07 DIAGNOSIS — I10 ESSENTIAL (PRIMARY) HYPERTENSION: ICD-10-CM

## 2022-11-07 DIAGNOSIS — G43.119 MIGRAINE WITH AURA, INTRACTABLE, WITHOUT STATUS MIGRAINOSUS: ICD-10-CM

## 2022-11-07 PROCEDURE — 99213 OFFICE O/P EST LOW 20 MIN: CPT | Performed by: NURSE PRACTITIONER

## 2022-11-07 PROCEDURE — 80048 BASIC METABOLIC PNL TOTAL CA: CPT

## 2022-11-07 PROCEDURE — 36415 COLL VENOUS BLD VENIPUNCTURE: CPT

## 2022-11-07 RX ORDER — AMITRIPTYLINE HYDROCHLORIDE 25 MG/1
25 TABLET, FILM COATED ORAL NIGHTLY PRN
Qty: 90 TABLET | Refills: 1 | Status: SHIPPED | OUTPATIENT
Start: 2022-11-07

## 2022-11-07 RX ORDER — CYCLOBENZAPRINE HCL 5 MG
TABLET ORAL
COMMUNITY
Start: 2022-08-19 | End: 2022-11-07

## 2022-11-07 RX ORDER — IBUPROFEN 800 MG/1
TABLET ORAL
COMMUNITY
Start: 2022-08-19 | End: 2022-11-07

## 2022-11-07 RX ORDER — ESTRADIOL 0.5 MG/1
0.5 TABLET ORAL DAILY
Qty: 90 TABLET | Refills: 1 | Status: SHIPPED | OUTPATIENT
Start: 2022-11-07

## 2022-11-07 RX ORDER — LISINOPRIL 30 MG/1
30 TABLET ORAL DAILY
Qty: 90 TABLET | Refills: 0 | Status: SHIPPED | OUTPATIENT
Start: 2022-11-07 | End: 2023-02-06

## 2022-11-07 NOTE — PROGRESS NOTES
Karen Vigil presents to [facility].    Chief Complaint:  Hypertension (Medication follow up/)         History of Present Illness:  Hypertension:  Current medication: Lisinopril   Tolerating Medication: Yes  Checking BP at home and it is: 126-160 over   Needs refills: Yes  Labs:  Lab Results       Component                Value               Date                       GLUCOSE                  103 (H)             2022                 BUN                      13                  2022                 CREATININE               0.99                2022                 EGFRIFAFRI               76                  2021                 BCR                      13.1                2022                 K                        4.0                 2022                 CO2                      29.7 (H)            2022                 CALCIUM                  9.6                 2022              PAST MEDICAL HISTORY changes since 9-:         Hypertension     Endometriosis     wears hearing aids/bilateral         GYNECOLOGICAL HISTORY:     miscarriage 1         PREVENTIVE HEALTH MAINTENANCE             COLORECTAL CANCER SCREENING: Up to date (colonoscopy q10y; sigmoidoscopy q5y; Cologuard q3y) was last done , Results are in chart     MAMMOGRAM: Done within last 2 years and results in are chart was last done 20 with normal results     PAP SMEAR: no longer due to history/ hysterectomy         Surgical History:         Hysterectomy: TAHBSO;     tympanoplasty L ear with hearing loss;         Family History:     Father:  at age 50's; Cause of death was MI     Mother: Hypertension;  Hyperthyroidism     Brother(s): 1 brother(s) total;  Hypertension     Son(s): Healthy; 1 son(s) total     Daughter(s): 1 daughter(s) total     Maternal Grandmother: Hypertension         Social History:     Occupation: [employer]  ([place])  works  "from home / started 1-2022    Marital Status:  and now remrried     Children: 2 children       Review of Systems:  Review of Systems   Constitutional: Negative for fatigue and fever.   Respiratory: Negative for cough and shortness of breath.    Cardiovascular: Negative for chest pain, palpitations and leg swelling.   Endocrine:        Takes her estorgen QOD and wants to continue to take    Genitourinary:        Mammogram  benign    Neurological: Positive for headache (takes elavil a few times a week and this helps prevent HA, weather changes increase her ha). Negative for numbness.          Current Outpatient Medications:   •  amitriptyline (ELAVIL) 25 MG tablet, Take 1 tablet by mouth At Night As Needed for Sleep., Disp: 90 tablet, Rfl: 1  •  estradiol (ESTRACE) 0.5 MG tablet, Take 1 tablet by mouth Daily., Disp: 90 tablet, Rfl: 1  •  lisinopril (PRINIVIL,ZESTRIL) 30 MG tablet, Take 1 tablet by mouth Daily., Disp: 90 tablet, Rfl: 0    Vital Signs:   Vitals:    11/07/22 1705   BP: 158/84   BP Location: Right arm   Patient Position: Sitting   Pulse: 67   Weight: 61 kg (134 lb 6.4 oz)   Height: 166.4 cm (65.5\")         Physical Exam:  Physical Exam  Vitals reviewed.   Constitutional:       General: She is not in acute distress.     Appearance: Normal appearance.   Neck:      Vascular: No carotid bruit.   Cardiovascular:      Rate and Rhythm: Normal rate and regular rhythm.      Heart sounds: Normal heart sounds. No murmur heard.  Pulmonary:      Effort: Pulmonary effort is normal. No respiratory distress.      Breath sounds: Normal breath sounds.   Musculoskeletal:      Right lower leg: No edema.      Left lower leg: No edema.   Neurological:      Mental Status: She is alert.   Psychiatric:         Mood and Affect: Mood normal.         Behavior: Behavior normal.         Result Review      The following data was reviewed by: ADI De La O on 11/07/2022:    Results for orders placed or " performed in visit on 04/18/22   Basic metabolic panel    Specimen: Blood   Result Value Ref Range    Glucose 103 (H) 65 - 99 mg/dL    BUN 13 6 - 20 mg/dL    Creatinine 0.99 0.57 - 1.00 mg/dL    Sodium 143 136 - 145 mmol/L    Potassium 4.0 3.5 - 5.2 mmol/L    Chloride 102 98 - 107 mmol/L    CO2 29.7 (H) 22.0 - 29.0 mmol/L    Calcium 9.6 8.6 - 10.5 mg/dL    BUN/Creatinine Ratio 13.1 7.0 - 25.0    Anion Gap 11.3 5.0 - 15.0 mmol/L    eGFR 67.5 >60.0 mL/min/1.73               Assessment and Plan:          Diagnoses and all orders for this visit:    1. Hormone replacement therapy (postmenopausal) (Primary)  Assessment & Plan:  Risk vs benefit of rx discussed, she would like to continue, reviewed her mammogram, continue to take QOD    Orders:  -     estradiol (ESTRACE) 0.5 MG tablet; Take 1 tablet by mouth Daily.  Dispense: 90 tablet; Refill: 1    2. Essential (primary) hypertension  Assessment & Plan:  Sent to lab today to recheck a BMP, increase her lisinopril to 30 mg daily, continue to monitor her BP, reviewed her BP log     Orders:  -     Basic metabolic panel; Future  -     lisinopril (PRINIVIL,ZESTRIL) 30 MG tablet; Take 1 tablet by mouth Daily.  Dispense: 90 tablet; Refill: 0    3. Migraine with aura, intractable, without status migrainosus  -     amitriptyline (ELAVIL) 25 MG tablet; Take 1 tablet by mouth At Night As Needed for Sleep.  Dispense: 90 tablet; Refill: 1        Follow Up   Return for followup pending lab results.  Patient was given instructions and counseling regarding her condition or for health maintenance advice. Please see specific information pulled into the AVS if appropriate.

## 2022-11-07 NOTE — ASSESSMENT & PLAN NOTE
Risk vs benefit of rx discussed, she would like to continue, reviewed her mammogram, continue to take QOD

## 2022-11-07 NOTE — ASSESSMENT & PLAN NOTE
Sent to lab today to recheck a BMP, increase her lisinopril to 30 mg daily, continue to monitor her BP, reviewed her BP log

## 2022-11-08 LAB
ANION GAP SERPL CALCULATED.3IONS-SCNC: 8.6 MMOL/L (ref 5–15)
BUN SERPL-MCNC: 16 MG/DL (ref 6–20)
BUN/CREAT SERPL: 10.8 (ref 7–25)
CALCIUM SPEC-SCNC: 9 MG/DL (ref 8.6–10.5)
CHLORIDE SERPL-SCNC: 101 MMOL/L (ref 98–107)
CO2 SERPL-SCNC: 29.4 MMOL/L (ref 22–29)
CREAT SERPL-MCNC: 1.48 MG/DL (ref 0.57–1)
EGFRCR SERPLBLD CKD-EPI 2021: 41.4 ML/MIN/1.73
GLUCOSE SERPL-MCNC: 92 MG/DL (ref 65–99)
POTASSIUM SERPL-SCNC: 4.1 MMOL/L (ref 3.5–5.2)
SODIUM SERPL-SCNC: 139 MMOL/L (ref 136–145)

## 2022-11-10 ENCOUNTER — TELEPHONE (OUTPATIENT)
Dept: FAMILY MEDICINE CLINIC | Age: 57
End: 2022-11-10

## 2022-11-10 DIAGNOSIS — R94.4 ABNORMAL KIDNEY FUNCTION STUDY: Primary | ICD-10-CM

## 2022-11-10 DIAGNOSIS — R74.8 ABNORMAL CREATINE KINASE LEVEL: Primary | ICD-10-CM

## 2022-11-10 NOTE — TELEPHONE ENCOUNTER
----- Message from ADI De La O sent at 11/10/2022  1:03 PM EST -----  Make sure there is a referral to Neph  ----- Message -----  From: Saray Laws MA  Sent: 11/10/2022   9:02 AM EST  To: ADI De La O    Pt inf, Patient is agreeable to the nephrologist referral. Order place, be sign.

## 2022-12-05 ENCOUNTER — TELEPHONE (OUTPATIENT)
Dept: FAMILY MEDICINE CLINIC | Age: 57
End: 2022-12-05

## 2022-12-13 ENCOUNTER — TRANSCRIBE ORDERS (OUTPATIENT)
Dept: ADMINISTRATIVE | Facility: HOSPITAL | Age: 57
End: 2022-12-13

## 2022-12-13 DIAGNOSIS — N28.9 ACUTE RENAL INSUFFICIENCY: Primary | ICD-10-CM

## 2022-12-15 ENCOUNTER — TRANSCRIBE ORDERS (OUTPATIENT)
Dept: ADMINISTRATIVE | Facility: HOSPITAL | Age: 57
End: 2022-12-15

## 2022-12-15 ENCOUNTER — LAB (OUTPATIENT)
Dept: LAB | Facility: HOSPITAL | Age: 57
End: 2022-12-15

## 2022-12-15 DIAGNOSIS — I10 ESSENTIAL HYPERTENSION, MALIGNANT: ICD-10-CM

## 2022-12-15 DIAGNOSIS — N28.9 URETERAL SLUDGE: ICD-10-CM

## 2022-12-15 DIAGNOSIS — N28.9 URETERAL SLUDGE: Primary | ICD-10-CM

## 2022-12-15 DIAGNOSIS — I10 ESSENTIAL HYPERTENSION, MALIGNANT: Primary | ICD-10-CM

## 2022-12-15 LAB
BACTERIA UR QL AUTO: ABNORMAL /HPF
BILIRUB UR QL STRIP: NEGATIVE
CLARITY UR: ABNORMAL
COLOR UR: YELLOW
CREAT UR-MCNC: 338.6 MG/DL
GLUCOSE UR STRIP-MCNC: NEGATIVE MG/DL
HGB UR QL STRIP.AUTO: NEGATIVE
KETONES UR QL STRIP: NEGATIVE
LEUKOCYTE ESTERASE UR QL STRIP.AUTO: NEGATIVE
MUCOUS THREADS URNS QL MICRO: ABNORMAL /HPF
NITRITE UR QL STRIP: NEGATIVE
PH UR STRIP.AUTO: 5.5 [PH] (ref 5–8)
PROT ?TM UR-MCNC: 11 MG/DL
PROT UR QL STRIP: NEGATIVE
PROT/CREAT UR: 0.03 MG/G{CREAT}
RBC # UR STRIP: ABNORMAL /HPF
REF LAB TEST METHOD: ABNORMAL
SP GR UR STRIP: >=1.03 (ref 1–1.03)
SQUAMOUS #/AREA URNS HPF: ABNORMAL /HPF
UROBILINOGEN UR QL STRIP: ABNORMAL
WBC # UR STRIP: ABNORMAL /HPF

## 2022-12-15 PROCEDURE — 82088 ASSAY OF ALDOSTERONE: CPT

## 2022-12-15 PROCEDURE — 82570 ASSAY OF URINE CREATININE: CPT

## 2022-12-15 PROCEDURE — 36415 COLL VENOUS BLD VENIPUNCTURE: CPT

## 2022-12-15 PROCEDURE — 84244 ASSAY OF RENIN: CPT

## 2022-12-15 PROCEDURE — 84156 ASSAY OF PROTEIN URINE: CPT

## 2022-12-15 PROCEDURE — 80048 BASIC METABOLIC PNL TOTAL CA: CPT

## 2022-12-15 PROCEDURE — 81001 URINALYSIS AUTO W/SCOPE: CPT

## 2022-12-16 LAB
ANION GAP SERPL CALCULATED.3IONS-SCNC: 7.6 MMOL/L (ref 5–15)
BUN SERPL-MCNC: 13 MG/DL (ref 6–20)
BUN/CREAT SERPL: 14.6 (ref 7–25)
CALCIUM SPEC-SCNC: 9.4 MG/DL (ref 8.6–10.5)
CHLORIDE SERPL-SCNC: 105 MMOL/L (ref 98–107)
CO2 SERPL-SCNC: 30.4 MMOL/L (ref 22–29)
CREAT SERPL-MCNC: 0.89 MG/DL (ref 0.57–1)
EGFRCR SERPLBLD CKD-EPI 2021: 76.2 ML/MIN/1.73
GLUCOSE SERPL-MCNC: 74 MG/DL (ref 65–99)
POTASSIUM SERPL-SCNC: 4.2 MMOL/L (ref 3.5–5.2)
SODIUM SERPL-SCNC: 143 MMOL/L (ref 136–145)

## 2022-12-20 LAB — ALDOST SERPL-MCNC: 5 NG/DL (ref 0–30)

## 2022-12-21 ENCOUNTER — HOSPITAL ENCOUNTER (OUTPATIENT)
Dept: ULTRASOUND IMAGING | Facility: HOSPITAL | Age: 57
Discharge: HOME | End: 2022-12-21
Admitting: INTERNAL MEDICINE

## 2022-12-21 DIAGNOSIS — N28.9 ACUTE RENAL INSUFFICIENCY: ICD-10-CM

## 2022-12-21 LAB — RENIN PLAS-CCNC: 0.42 NG/ML/HR (ref 0.17–5.38)

## 2022-12-21 PROCEDURE — 76775 US EXAM ABDO BACK WALL LIM: CPT

## 2023-02-06 DIAGNOSIS — I10 ESSENTIAL (PRIMARY) HYPERTENSION: ICD-10-CM

## 2023-02-06 RX ORDER — LISINOPRIL 30 MG/1
TABLET ORAL
Qty: 30 TABLET | Refills: 3 | Status: SHIPPED | OUTPATIENT
Start: 2023-02-06

## 2023-02-06 NOTE — TELEPHONE ENCOUNTER
Rx Refill Note  Requested Prescriptions     Pending Prescriptions Disp Refills   • lisinopril (PRINIVIL,ZESTRIL) 30 MG tablet [Pharmacy Med Name: LISINOPRIL 30 MG TABLET] 30 tablet 3     Sig: TAKE ONE TABLET BY MOUTH DAILY      Last office visit with prescribing clinician: 11/7/2022      Next office visit with prescribing clinician: Visit date not found        Saray Laws LPN  02/06/23, 09:43 EST

## 2023-03-08 ENCOUNTER — LAB (OUTPATIENT)
Dept: LAB | Facility: HOSPITAL | Age: 58
End: 2023-03-08
Payer: COMMERCIAL

## 2023-03-08 DIAGNOSIS — N28.9 URETERAL SLUDGE: ICD-10-CM

## 2023-03-08 LAB
ANION GAP SERPL CALCULATED.3IONS-SCNC: 9.6 MMOL/L (ref 5–15)
BACTERIA UR QL AUTO: ABNORMAL /HPF
BILIRUB UR QL STRIP: NEGATIVE
BUN SERPL-MCNC: 2 MG/DL (ref 6–20)
BUN/CREAT SERPL: 2.1 (ref 7–25)
CALCIUM SPEC-SCNC: 9.8 MG/DL (ref 8.6–10.5)
CHLORIDE SERPL-SCNC: 104 MMOL/L (ref 98–107)
CLARITY UR: ABNORMAL
CO2 SERPL-SCNC: 29.4 MMOL/L (ref 22–29)
COLOR UR: YELLOW
CREAT SERPL-MCNC: 0.94 MG/DL (ref 0.57–1)
CREAT UR-MCNC: 132.7 MG/DL
EGFRCR SERPLBLD CKD-EPI 2021: 71.4 ML/MIN/1.73
GLUCOSE SERPL-MCNC: 117 MG/DL (ref 65–99)
GLUCOSE UR STRIP-MCNC: NEGATIVE MG/DL
HGB UR QL STRIP.AUTO: NEGATIVE
HYALINE CASTS UR QL AUTO: ABNORMAL /LPF
KETONES UR QL STRIP: NEGATIVE
LEUKOCYTE ESTERASE UR QL STRIP.AUTO: NEGATIVE
NITRITE UR QL STRIP: NEGATIVE
PH UR STRIP.AUTO: 6 [PH] (ref 5–8)
POTASSIUM SERPL-SCNC: 4.1 MMOL/L (ref 3.5–5.2)
PROT ?TM UR-MCNC: 7.6 MG/DL
PROT UR QL STRIP: NEGATIVE
PROT/CREAT UR: 0.06 MG/G{CREAT}
RBC # UR STRIP: ABNORMAL /HPF
REF LAB TEST METHOD: ABNORMAL
SODIUM SERPL-SCNC: 143 MMOL/L (ref 136–145)
SP GR UR STRIP: 1.02 (ref 1–1.03)
SQUAMOUS #/AREA URNS HPF: ABNORMAL /HPF
UROBILINOGEN UR QL STRIP: ABNORMAL
WBC # UR STRIP: ABNORMAL /HPF

## 2023-03-08 PROCEDURE — 36415 COLL VENOUS BLD VENIPUNCTURE: CPT

## 2023-03-08 PROCEDURE — 80048 BASIC METABOLIC PNL TOTAL CA: CPT

## 2023-03-08 PROCEDURE — 81001 URINALYSIS AUTO W/SCOPE: CPT

## 2023-03-08 PROCEDURE — 84156 ASSAY OF PROTEIN URINE: CPT

## 2023-03-08 PROCEDURE — 82570 ASSAY OF URINE CREATININE: CPT

## 2023-05-09 ENCOUNTER — OFFICE VISIT (OUTPATIENT)
Dept: FAMILY MEDICINE CLINIC | Age: 58
End: 2023-05-09
Payer: COMMERCIAL

## 2023-05-09 VITALS
BODY MASS INDEX: 21.05 KG/M2 | WEIGHT: 131 LBS | HEIGHT: 66 IN | HEART RATE: 77 BPM | DIASTOLIC BLOOD PRESSURE: 88 MMHG | SYSTOLIC BLOOD PRESSURE: 147 MMHG

## 2023-05-09 DIAGNOSIS — Z79.890 HORMONE REPLACEMENT THERAPY (POSTMENOPAUSAL): ICD-10-CM

## 2023-05-09 DIAGNOSIS — G43.119 MIGRAINE WITH AURA, INTRACTABLE, WITHOUT STATUS MIGRAINOSUS: ICD-10-CM

## 2023-05-09 DIAGNOSIS — I10 ESSENTIAL (PRIMARY) HYPERTENSION: Primary | ICD-10-CM

## 2023-05-09 PROCEDURE — 99213 OFFICE O/P EST LOW 20 MIN: CPT | Performed by: NURSE PRACTITIONER

## 2023-05-09 RX ORDER — AMITRIPTYLINE HYDROCHLORIDE 25 MG/1
25 TABLET, FILM COATED ORAL NIGHTLY PRN
Qty: 90 TABLET | Refills: 1 | Status: SHIPPED | OUTPATIENT
Start: 2023-05-09

## 2023-05-09 RX ORDER — ESTRADIOL 0.5 MG/1
0.5 TABLET ORAL DAILY
Qty: 90 TABLET | Refills: 1 | Status: SHIPPED | OUTPATIENT
Start: 2023-05-09

## 2023-05-09 RX ORDER — LISINOPRIL 30 MG/1
30 TABLET ORAL DAILY
Qty: 90 TABLET | Refills: 1 | Status: SHIPPED | OUTPATIENT
Start: 2023-05-09

## 2023-05-09 NOTE — PROGRESS NOTES
Karen Vigil presents to [facility].    Chief Complaint:  Hypertension (6 month check )         History of Present Illness:  Hypertension:  Current medication: lisinopril   Tolerating Medication: Yes  Checking BP at home and it is: not checking   Needs refills: Yes, [facility]   Labs:  Lab Results       Component                Value               Date                       GLUCOSE                  117 (H)             2023                 BUN                      2 (L)               2023                 CREATININE               0.94                2023                 EGFRIFAFRI               76                  2021                 BCR                      2.1 (L)             2023                 K                        4.1                 2023                 CO2                      29.4 (H)            2023                 CALCIUM                  9.8                 2023              History of headaches and takes elavil a few days a week that helps. Needs refills     Postmenopausal and takes HRT, mammogram UTD, she takes rx QOD and wants to continue     PAST MEDICAL HISTORY changes since :       Seen neph, had labs and follow up 3-2023    Hypertension     Endometriosis     wears hearing aids/bilateral         GYNECOLOGICAL HISTORY:     miscarriage 1         PREVENTIVE HEALTH MAINTENANCE             COLORECTAL CANCER SCREENING: Up to date (colonoscopy q10y; sigmoidoscopy q5y; Cologuard q3y) was last done , Results are in chart     MAMMOGRAM: Done within last 2 years and results in are chart was last done     PAP SMEAR: no longer due to history/ hysterectomy         Surgical History:         Hysterectomy: TAHBSO;     tympanoplasty L ear with hearing loss;         Family History:     Father:  at age 50's; Cause of death was MI     Mother: Hypertension;  Hyperthyroidism     Brother(s): 1 brother(s) total;   "Hypertension     Son(s): Healthy; 1 son(s) total     Daughter(s): 1 daughter(s) total     Maternal Grandmother: Hypertension         Social History:     Occupation: [employer]  ([place])  works from home / started 1-2022    Marital Status:  and now remrried     Children: 2 children       Review of Systems:  Review of Systems   Constitutional: Negative for fatigue and fever.   Respiratory: Negative for cough and shortness of breath.    Cardiovascular: Negative for chest pain, palpitations and leg swelling.          Current Outpatient Medications:   •  amitriptyline (ELAVIL) 25 MG tablet, Take 1 tablet by mouth At Night As Needed for Sleep., Disp: 90 tablet, Rfl: 1  •  estradiol (ESTRACE) 0.5 MG tablet, Take 1 tablet by mouth Daily., Disp: 90 tablet, Rfl: 1  •  lisinopril (PRINIVIL,ZESTRIL) 30 MG tablet, Take 1 tablet by mouth Daily., Disp: 90 tablet, Rfl: 1  •  Multiple Vitamins-Minerals (MULTIVITAMIN ADULT, MINERALS, PO), Take 1 tablet by mouth Daily., Disp: , Rfl:   •  Omega-3 Fatty Acids (Fish Oil) 1200 MG capsule delayed-release capsule, Take 1 capsule by mouth Daily., Disp: , Rfl:     Vital Signs:   Vitals:    05/09/23 1704 05/09/23 1717   BP: 157/88 147/88   BP Location: Right arm Right arm   Patient Position: Sitting Sitting   Pulse: 83 77   Weight: 59.4 kg (131 lb)    Height: 166.4 cm (65.5\")          Physical Exam:  Physical Exam  Vitals reviewed.   Constitutional:       General: She is not in acute distress.     Appearance: Normal appearance.   Neck:      Vascular: No carotid bruit.   Cardiovascular:      Rate and Rhythm: Normal rate and regular rhythm.      Heart sounds: Normal heart sounds. No murmur heard.  Pulmonary:      Effort: Pulmonary effort is normal. No respiratory distress.      Breath sounds: Normal breath sounds.   Musculoskeletal:      Right lower leg: No edema.      Left lower leg: No edema.   Neurological:      Mental Status: She is alert.   Psychiatric:         Mood and Affect: Mood " normal.         Behavior: Behavior normal.         Result Review      The following data was reviewed by: ADI De La O on 05/09/2023:    Results for orders placed or performed in visit on 03/08/23   Protein / Creatinine Ratio, Urine - Urine, Clean Catch    Specimen: Urine, Clean Catch   Result Value Ref Range    Creatinine, Urine 132.7 mg/dL    Total Protein, Urine 7.6 mg/dL    Protein/Creatinine Ratio, Urine 0.06    Basic Metabolic Panel    Specimen: Blood   Result Value Ref Range    Glucose 117 (H) 65 - 99 mg/dL    BUN 2 (L) 6 - 20 mg/dL    Creatinine 0.94 0.57 - 1.00 mg/dL    Sodium 143 136 - 145 mmol/L    Potassium 4.1 3.5 - 5.2 mmol/L    Chloride 104 98 - 107 mmol/L    CO2 29.4 (H) 22.0 - 29.0 mmol/L    Calcium 9.8 8.6 - 10.5 mg/dL    BUN/Creatinine Ratio 2.1 (L) 7.0 - 25.0    Anion Gap 9.6 5.0 - 15.0 mmol/L    eGFR 71.4 >60.0 mL/min/1.73   Urinalysis without microscopic (no culture) - Urine, Clean Catch    Specimen: Urine, Clean Catch   Result Value Ref Range    Color, UA Yellow Yellow, Straw    Appearance, UA Cloudy (A) Clear    pH, UA 6.0 5.0 - 8.0    Specific Gravity, UA 1.016 1.005 - 1.030    Glucose, UA Negative Negative    Ketones, UA Negative Negative    Bilirubin, UA Negative Negative    Blood, UA Negative Negative    Protein, UA Negative Negative    Leuk Esterase, UA Negative Negative    Nitrite, UA Negative Negative    Urobilinogen, UA 0.2 E.U./dL 0.2 - 1.0 E.U./dL   Urinalysis, Microscopic Only - Urine, Clean Catch    Specimen: Urine, Clean Catch   Result Value Ref Range    RBC, UA 0-2 None Seen, 0-2 /HPF    WBC, UA 0-2 None Seen, 0-2 /HPF    Bacteria, UA None Seen None Seen /HPF    Squamous Epithelial Cells, UA 3-6 (A) None Seen, 0-2 /HPF    Hyaline Casts, UA 0-2 None Seen /LPF    Methodology Automated Microscopy                Assessment and Plan:          Diagnoses and all orders for this visit:    1. Essential (primary) hypertension (Primary)  Assessment & Plan:  Rechecked bp, she  rushed in today, to recheck at home, keep log.  Reviewed her last few labs. .  to monitor BP at home. Continue current meds. Continue to modify diet and lifestyle. Will need labs every 6 months and follow up.   Discussed drinking adequate water     Orders:  -     lisinopril (PRINIVIL,ZESTRIL) 30 MG tablet; Take 1 tablet by mouth Daily.  Dispense: 90 tablet; Refill: 1    2. Migraine with aura, intractable, without status migrainosus  Assessment & Plan:  Continue elavil       Orders:  -     amitriptyline (ELAVIL) 25 MG tablet; Take 1 tablet by mouth At Night As Needed for Sleep.  Dispense: 90 tablet; Refill: 1    3. Hormone replacement therapy (postmenopausal)  Assessment & Plan:  Risk vs benefit of HRT discussed, pt to continue and will be mammogram in     Orders:  -     estradiol (ESTRACE) 0.5 MG tablet; Take 1 tablet by mouth Daily.  Dispense: 90 tablet; Refill: 1        Follow Up   Return in about 6 months (around 11/9/2023).  Patient was given instructions and counseling regarding her condition or for health maintenance advice. Please see specific information pulled into the AVS if appropriate.

## 2023-05-09 NOTE — ASSESSMENT & PLAN NOTE
Rechecked bp, she rushed in today, to recheck at home, keep log.  Reviewed her last few labs. .  to monitor BP at home. Continue current meds. Continue to modify diet and lifestyle. Will need labs every 6 months and follow up.   Discussed drinking adequate water

## 2023-11-14 ENCOUNTER — LAB (OUTPATIENT)
Dept: LAB | Facility: HOSPITAL | Age: 58
End: 2023-11-14
Payer: COMMERCIAL

## 2023-11-14 ENCOUNTER — OFFICE VISIT (OUTPATIENT)
Dept: FAMILY MEDICINE CLINIC | Age: 58
End: 2023-11-14
Payer: COMMERCIAL

## 2023-11-14 VITALS
HEART RATE: 75 BPM | DIASTOLIC BLOOD PRESSURE: 81 MMHG | WEIGHT: 133 LBS | HEIGHT: 66 IN | SYSTOLIC BLOOD PRESSURE: 176 MMHG | BODY MASS INDEX: 21.38 KG/M2

## 2023-11-14 DIAGNOSIS — Z11.59 SCREENING FOR VIRAL DISEASE: ICD-10-CM

## 2023-11-14 DIAGNOSIS — G43.119 MIGRAINE WITH AURA, INTRACTABLE, WITHOUT STATUS MIGRAINOSUS: ICD-10-CM

## 2023-11-14 DIAGNOSIS — Z79.890 HORMONE REPLACEMENT THERAPY (POSTMENOPAUSAL): ICD-10-CM

## 2023-11-14 DIAGNOSIS — I10 ESSENTIAL (PRIMARY) HYPERTENSION: Primary | ICD-10-CM

## 2023-11-14 LAB
ALBUMIN SERPL-MCNC: 4.3 G/DL (ref 3.5–5.2)
ALBUMIN/GLOB SERPL: 1.5 G/DL
ALP SERPL-CCNC: 74 U/L (ref 39–117)
ALT SERPL W P-5'-P-CCNC: 16 U/L (ref 1–33)
ANION GAP SERPL CALCULATED.3IONS-SCNC: 8.8 MMOL/L (ref 5–15)
AST SERPL-CCNC: 21 U/L (ref 1–32)
BILIRUB SERPL-MCNC: <0.2 MG/DL (ref 0–1.2)
BUN SERPL-MCNC: 9 MG/DL (ref 6–20)
BUN/CREAT SERPL: 10 (ref 7–25)
CALCIUM SPEC-SCNC: 9.2 MG/DL (ref 8.6–10.5)
CHLORIDE SERPL-SCNC: 103 MMOL/L (ref 98–107)
CHOLEST SERPL-MCNC: 154 MG/DL (ref 0–200)
CO2 SERPL-SCNC: 28.2 MMOL/L (ref 22–29)
CREAT SERPL-MCNC: 0.9 MG/DL (ref 0.57–1)
EGFRCR SERPLBLD CKD-EPI 2021: 74.7 ML/MIN/1.73
GLOBULIN UR ELPH-MCNC: 2.8 GM/DL
GLUCOSE SERPL-MCNC: 94 MG/DL (ref 65–99)
HCV AB SER DONR QL: NORMAL
HDLC SERPL-MCNC: 52 MG/DL (ref 40–60)
LDLC SERPL CALC-MCNC: 70 MG/DL (ref 0–100)
LDLC/HDLC SERPL: 1.22 {RATIO}
POTASSIUM SERPL-SCNC: 4.1 MMOL/L (ref 3.5–5.2)
PROT SERPL-MCNC: 7.1 G/DL (ref 6–8.5)
SODIUM SERPL-SCNC: 140 MMOL/L (ref 136–145)
TRIGL SERPL-MCNC: 193 MG/DL (ref 0–150)
VLDLC SERPL-MCNC: 32 MG/DL (ref 5–40)

## 2023-11-14 PROCEDURE — 99214 OFFICE O/P EST MOD 30 MIN: CPT | Performed by: NURSE PRACTITIONER

## 2023-11-14 PROCEDURE — 36415 COLL VENOUS BLD VENIPUNCTURE: CPT

## 2023-11-14 PROCEDURE — 80053 COMPREHEN METABOLIC PANEL: CPT | Performed by: NURSE PRACTITIONER

## 2023-11-14 PROCEDURE — 80061 LIPID PANEL: CPT | Performed by: NURSE PRACTITIONER

## 2023-11-14 PROCEDURE — 86803 HEPATITIS C AB TEST: CPT

## 2023-11-14 RX ORDER — ESTRADIOL 0.5 MG/1
0.5 TABLET ORAL EVERY OTHER DAY
Qty: 45 TABLET | Refills: 0 | Status: SHIPPED | OUTPATIENT
Start: 2023-11-14

## 2023-11-14 RX ORDER — LISINOPRIL 30 MG/1
30 TABLET ORAL DAILY
Qty: 90 TABLET | Refills: 0 | Status: SHIPPED | OUTPATIENT
Start: 2023-11-14

## 2023-11-14 NOTE — PROGRESS NOTES
Chief Complaint  Hypertension    Subjective          Karen Vigil presents to [facility]    History of Present Illness  Hypertension:  Current medication: lisinopril   Tolerating Medication: Yes   Checking BP at home and it is: not checking   Needs refills: Yes to [facility]   Labs:  Lab Results       Component                Value               Date                       GLUCOSE                  117 (H)             2023                 BUN                      2 (L)               2023                 CREATININE               0.94                2023                 EGFRIFAFRI               76                  2021                 BCR                      2.1 (L)             2023                 K                        4.1                 2023                 CO2                      29.4 (H)            2023                 CALCIUM                  9.8                 2023              On HRT and takes QOD rx helps and she would like to continue, her last mammogram was     HA elavil / working well and does not need refills     PAST MEDICAL HISTORY changes since :       Seen neph, had labs and follow up 3-2023    Hypertension     Endometriosis     wears hearing aids/bilateral         GYNECOLOGICAL HISTORY:     miscarriage 1         PREVENTIVE HEALTH MAINTENANCE             COLORECTAL CANCER SCREENING: Up to date (colonoscopy q10y; sigmoidoscopy q5y; Cologuard q3y) was last done -, Results are in chart     MAMMOGRAM: Done within last 2 years and results in are chart was last done     PAP SMEAR: no longer due to history/ hysterectomy         Surgical History:         Hysterectomy: TAHBSO;     tympanoplasty L ear with hearing loss;         Family History:     Father:  at age 50's; Cause of death was MI     Mother: Hypertension;  Hyperthyroidism     Brother(s): 1 brother(s) total;  Hypertension     Son(s):  Healthy; 1 son(s) total     Daughter(s): 1 daughter(s) total     Maternal Grandmother: Hypertension         Social History:     Occupation: [employer]  ([place])  works from home / started 1-2022    Marital Status:  and now remrried     Children: 2 children              Past Medical History:   Diagnosis Date    Abnormal results of kidney function studies     Allergic rhinitis     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, face and neck     Endometriosis     Essential (primary) hypertension     Hormone replacement therapy (postmenopausal)     Migraine with aura, intractable, without status migrainosus     Radiculopathy, lumbar region     Wears hearing aid in both ears        Allergies   Allergen Reactions    Conjugated Estrogens Unknown - Low Severity        Past Surgical History:   Procedure Laterality Date    HYSTERECTOMY      TAHBSO    TYMPANOPLASTY      LEFT EAR WITH HEARING LOSS        Social History     Tobacco Use    Smoking status: Never    Smokeless tobacco: Never   Substance Use Topics    Alcohol use: Never       Family History   Problem Relation Age of Onset    Hypertension Mother     Hyperthyroidism Mother     Heart attack Father     Hypertension Maternal Grandmother         Health Maintenance Due   Topic Date Due    ZOSTER VACCINE (1 of 2) Never done    HEPATITIS C SCREENING  Never done    ANNUAL PHYSICAL  Never done    COVID-19 Vaccine (4 - 2023-24 season) 09/01/2023        Current Outpatient Medications on File Prior to Visit   Medication Sig    amitriptyline (ELAVIL) 25 MG tablet Take 1 tablet by mouth At Night As Needed for Sleep.    Multiple Vitamins-Minerals (MULTIVITAMIN ADULT, MINERALS, PO) Take 1 tablet by mouth Daily.    Omega-3 Fatty Acids (Fish Oil) 1200 MG capsule delayed-release capsule Take 1 capsule by mouth Daily.    [DISCONTINUED] estradiol (ESTRACE) 0.5 MG tablet Take 1 tablet by mouth Daily. (Patient taking differently: Take 1 tablet by mouth Every Other  "Day.)    [DISCONTINUED] lisinopril (PRINIVIL,ZESTRIL) 30 MG tablet Take 1 tablet by mouth Daily.     No current facility-administered medications on file prior to visit.       Immunization History   Administered Date(s) Administered    COVID-19 (PFIZER) BIVALENT 12+YRS 11/05/2022    COVID-19 (PFIZER) Purple Cap Monovalent 04/12/2021, 05/03/2021    Fluzone (or Fluarix & Flulaval for VFC) >6mos 10/20/2021    Influenza, Unspecified 10/09/2023    Tdap 11/27/2019       Review of Systems   Constitutional:  Negative for fatigue and fever.   Respiratory:  Negative for cough and shortness of breath.    Cardiovascular:  Negative for chest pain, palpitations and leg swelling.        Objective     Vitals:    11/14/23 1535 11/14/23 1559   BP: 161/99 176/81   BP Location: Left arm Left arm   Patient Position: Sitting Sitting   Pulse: 64 75   Weight: 60.3 kg (133 lb)    Height: 166.4 cm (65.5\")             Physical Exam  Vitals reviewed.   Constitutional:       General: She is not in acute distress.     Appearance: Normal appearance.   Neck:      Vascular: No carotid bruit.   Cardiovascular:      Rate and Rhythm: Normal rate and regular rhythm.      Heart sounds: Normal heart sounds. No murmur heard.  Pulmonary:      Effort: Pulmonary effort is normal. No respiratory distress.      Breath sounds: Normal breath sounds.   Chest:   Breasts:     Right: Normal. No mass, nipple discharge or skin change.      Left: Normal. No mass, nipple discharge or skin change.   Musculoskeletal:      Right lower leg: No edema.      Left lower leg: No edema.   Neurological:      Mental Status: She is alert.   Psychiatric:         Mood and Affect: Mood normal.         Behavior: Behavior normal.         Result Review :     The following data was reviewed by: ADI De La O on 11/14/2023:                       Assessment and Plan      Diagnoses and all orders for this visit:    1. Essential (primary) hypertension (Primary)  Assessment & " Plan:  She reports that on 10-9-23 she got her flu vaccine at work   BP is up,  to monitor BP at home. To continue lisinopril, she has not eaten since early this am, will check labs     Orders:  -     Comprehensive Metabolic Panel  -     Lipid Panel  -     lisinopril (PRINIVIL,ZESTRIL) 30 MG tablet; Take 1 tablet by mouth Daily.  Dispense: 90 tablet; Refill: 0    2. Migraine with aura, intractable, without status migrainosus  Assessment & Plan:  continue elavil           3. Hormone replacement therapy (postmenopausal)  Assessment & Plan:  Reviewed risk vs benefit of rx, she would like to continue HRT, setting up mammogram, continue BSE monthly     Orders:  -     Mammo Screening Digital Tomosynthesis Bilateral With CAD; Future  -     estradiol (ESTRACE) 0.5 MG tablet; Take 1 tablet by mouth Every Other Day.  Dispense: 45 tablet; Refill: 0    4. Screening for viral disease  Assessment & Plan:  Screen for hep C     Orders:  -     Hepatitis C antibody; Future        BMI is within normal parameters. No other follow-up for BMI required.           Follow Up     Return for followup pending lab results.    Patient was given instructions and counseling regarding her condition or for health maintenance advice. Please see specific information pulled into the AVS if appropriate.

## 2023-11-14 NOTE — ASSESSMENT & PLAN NOTE
She reports that on 10-9-23 she got her flu vaccine at work   BP is up,  to monitor BP at home. To continue lisinopril, she has not eaten since early this am, will check labs

## 2023-11-14 NOTE — ASSESSMENT & PLAN NOTE
Reviewed risk vs benefit of rx, she would like to continue HRT, setting up mammogram, continue BSE monthly

## 2023-12-07 ENCOUNTER — HOSPITAL ENCOUNTER (OUTPATIENT)
Dept: MAMMOGRAPHY | Facility: HOSPITAL | Age: 58
Discharge: HOME | End: 2023-12-07
Admitting: NURSE PRACTITIONER
Payer: COMMERCIAL

## 2023-12-07 DIAGNOSIS — Z79.890 HORMONE REPLACEMENT THERAPY (POSTMENOPAUSAL): ICD-10-CM

## 2023-12-07 PROCEDURE — 77067 SCR MAMMO BI INCL CAD: CPT

## 2023-12-07 PROCEDURE — 77063 BREAST TOMOSYNTHESIS BI: CPT

## 2024-03-05 ENCOUNTER — OFFICE VISIT (OUTPATIENT)
Dept: FAMILY MEDICINE CLINIC | Age: 59
End: 2024-03-05
Payer: COMMERCIAL

## 2024-03-05 VITALS
HEIGHT: 66 IN | HEART RATE: 63 BPM | WEIGHT: 133.8 LBS | SYSTOLIC BLOOD PRESSURE: 163 MMHG | TEMPERATURE: 97.7 F | BODY MASS INDEX: 21.5 KG/M2 | DIASTOLIC BLOOD PRESSURE: 93 MMHG

## 2024-03-05 DIAGNOSIS — G43.119 MIGRAINE WITH AURA, INTRACTABLE, WITHOUT STATUS MIGRAINOSUS: ICD-10-CM

## 2024-03-05 DIAGNOSIS — D17.1 LIPOMA OF BACK: ICD-10-CM

## 2024-03-05 DIAGNOSIS — Z79.890 HORMONE REPLACEMENT THERAPY (POSTMENOPAUSAL): ICD-10-CM

## 2024-03-05 DIAGNOSIS — I10 ESSENTIAL (PRIMARY) HYPERTENSION: Primary | ICD-10-CM

## 2024-03-05 PROBLEM — J01.00 ACUTE MAXILLARY SINUSITIS: Status: RESOLVED | Noted: 2022-09-15 | Resolved: 2024-03-05

## 2024-03-05 PROCEDURE — 99214 OFFICE O/P EST MOD 30 MIN: CPT | Performed by: NURSE PRACTITIONER

## 2024-03-05 RX ORDER — ESTRADIOL 0.5 MG/1
0.5 TABLET ORAL EVERY OTHER DAY
Qty: 45 TABLET | Refills: 0 | Status: SHIPPED | OUTPATIENT
Start: 2024-03-05

## 2024-03-05 RX ORDER — LISINOPRIL AND HYDROCHLOROTHIAZIDE 20; 12.5 MG/1; MG/1
1 TABLET ORAL DAILY
Qty: 90 TABLET | Refills: 0 | Status: SHIPPED | OUTPATIENT
Start: 2024-03-05

## 2024-03-05 RX ORDER — AMITRIPTYLINE HYDROCHLORIDE 25 MG/1
25 TABLET, FILM COATED ORAL NIGHTLY PRN
Qty: 90 TABLET | Refills: 0 | Status: SHIPPED | OUTPATIENT
Start: 2024-03-05

## 2024-03-05 NOTE — PROGRESS NOTES
Chief Complaint  Hypertension (6 month follow up )    Subjective          Karen Vigil presents to [facility]    History of Present Illness  Hypertension:  Current medication: lisinopril   Tolerating Medication: Yes  Checking BP at home and it is: 114-170's  (BP was better on HCTZ) but we changed to just ACE due to urinary frequency with the diuretic   Needs refills: Yes  Labs:  Lab Results       Component                Value               Date                       GLUCOSE                  94                  2023                 BUN                      9                   2023                 CREATININE               0.90                2023                 EGFRIFAFRI               76                  2021                 BCR                      10.0                2023                 K                        4.1                 2023                 CO2                      28.2                2023                 CALCIUM                  9.2                 2023                 ALBUMIN                  4.3                 2023                 AST                      21                  2023                 ALT                      16                  2023              Lipoma on her back is bothering and getting bigger ( had 5 years )     Needs elavil takes for headaches   Needs estrogen she takes QOD that helps/ last mammogram in  negative     PAST MEDICAL HISTORY changes since 2023:       Seen neph, had labs and follow up 3-2023    Hypertension     Endometriosis     wears hearing aids/bilateral         GYNECOLOGICAL HISTORY:     miscarriage 1         PREVENTIVE HEALTH MAINTENANCE             COLORECTAL CANCER SCREENING: Up to date (colonoscopy q10y; sigmoidoscopy q5y; Cologuard q3y) was last done -, Results are in chart     MAMMOGRAM: Done within last 2 years and results in are  chart was last done     PAP SMEAR: no longer due to history/ hysterectomy         Surgical History:         Hysterectomy: TAHBSO;     tympanoplasty L ear with hearing loss;         Family History:     Father:  at age 50's; Cause of death was MI     Mother: Hypertension;  Hyperthyroidism     Brother(s): 1 brother(s) total;  Hypertension     Son(s): Healthy; 1 son(s) total     Daughter(s): 1 daughter(s) total     Maternal Grandmother: Hypertension         Social History:     Occupation: [employer]  ([place])  works from home / started     Marital Status:  and now remrried     Children: 2 children              Past Medical History:   Diagnosis Date    Abnormal results of kidney function studies     Allergic rhinitis     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, face and neck     Endometriosis     Essential (primary) hypertension     Hormone replacement therapy (postmenopausal)     Migraine with aura, intractable, without status migrainosus     Radiculopathy, lumbar region     Wears hearing aid in both ears        Allergies   Allergen Reactions    Conjugated Estrogens Unknown - Low Severity        Past Surgical History:   Procedure Laterality Date    HYSTERECTOMY      TAHBSO    TYMPANOPLASTY      LEFT EAR WITH HEARING LOSS        Social History     Tobacco Use    Smoking status: Never    Smokeless tobacco: Never   Substance Use Topics    Alcohol use: Never       Family History   Problem Relation Age of Onset    Hypertension Mother     Hyperthyroidism Mother     Heart attack Father     Hypertension Maternal Grandmother         Health Maintenance Due   Topic Date Due    ZOSTER VACCINE (1 of 2) Never done    ANNUAL PHYSICAL  Never done    COVID-19 Vaccine (2023- season) 2023        Current Outpatient Medications on File Prior to Visit   Medication Sig    Multiple Vitamins-Minerals (MULTIVITAMIN ADULT, MINERALS, PO) Take 1 tablet by mouth Daily.    Omega-3 Fatty  "Acids (Fish Oil) 1200 MG capsule delayed-release capsule Take 1 capsule by mouth Daily.    [DISCONTINUED] amitriptyline (ELAVIL) 25 MG tablet Take 1 tablet by mouth At Night As Needed for Sleep.    [DISCONTINUED] estradiol (ESTRACE) 0.5 MG tablet Take 1 tablet by mouth Every Other Day.    [DISCONTINUED] lisinopril (PRINIVIL,ZESTRIL) 30 MG tablet Take 1 tablet by mouth Daily.     No current facility-administered medications on file prior to visit.       Immunization History   Administered Date(s) Administered    COVID-19 (PFIZER) BIVALENT 12+YRS 11/05/2022    COVID-19 (PFIZER) Purple Cap Monovalent 04/12/2021, 05/03/2021    Fluzone (or Fluarix & Flulaval for VFC) >6mos 10/20/2021    Influenza, Unspecified 10/09/2023    Tdap 11/27/2019       Review of Systems   Constitutional:  Negative for fatigue and fever.   Respiratory:  Negative for cough and shortness of breath.    Cardiovascular:  Negative for chest pain, palpitations and leg swelling.        Objective     Vitals:    03/05/24 1655   BP: 163/93   BP Location: Left arm   Patient Position: Sitting   Cuff Size: Adult   Pulse: 63   Temp: 97.7 °F (36.5 °C)   TempSrc: Oral   Weight: 60.7 kg (133 lb 12.8 oz)   Height: 166.4 cm (65.5\")            Physical Exam  Vitals reviewed.   Constitutional:       General: She is not in acute distress.     Appearance: Normal appearance.   Neck:      Vascular: No carotid bruit.   Cardiovascular:      Rate and Rhythm: Normal rate and regular rhythm.      Heart sounds: Normal heart sounds. No murmur heard.  Pulmonary:      Effort: Pulmonary effort is normal. No respiratory distress.      Breath sounds: Normal breath sounds.   Musculoskeletal:      Right lower leg: No edema.      Left lower leg: No edema.   Skin:     Comments: Palpable area to right lateral to mid back non tender    Neurological:      Mental Status: She is alert.   Psychiatric:         Mood and Affect: Mood normal.         Behavior: Behavior normal.         Result " Review :     The following data was reviewed by: ADI De La O on 03/05/2024:                       Assessment and Plan      Diagnoses and all orders for this visit:    1. Essential (primary) hypertension (Primary)  Assessment & Plan:  Reviewed her BP log, to continue to monitor, work on diet, switch her back to Lisinopril HCTZ 20/12.5 mg daily, reviewed her last labs     Orders:  -     lisinopril-hydrochlorothiazide (PRINZIDE,ZESTORETIC) 20-12.5 MG per tablet; Take 1 tablet by mouth Daily.  Dispense: 90 tablet; Refill: 0    2. Migraine with aura, intractable, without status migrainosus  Assessment & Plan:  continue elavil             Orders:  -     amitriptyline (ELAVIL) 25 MG tablet; Take 1 tablet by mouth At Night As Needed for Sleep.  Dispense: 90 tablet; Refill: 0    3. Hormone replacement therapy (postmenopausal)  Assessment & Plan:  Reviewed risk vs benefit, reviewed mammogram, refilled her rx     Orders:  -     estradiol (ESTRACE) 0.5 MG tablet; Take 1 tablet by mouth Every Other Day.  Dispense: 45 tablet; Refill: 0    4. Lipoma of back  Assessment & Plan:  Refer to surgeon     Orders:  -     Ambulatory Referral to General Surgery        BMI is within normal parameters. No other follow-up for BMI required.         Follow Up     Return if symptoms worsen or fail to improve, for Next scheduled follow up.    Patient was given instructions and counseling regarding her condition or for health maintenance advice. Please see specific information pulled into the AVS if appropriate.

## 2024-03-05 NOTE — ASSESSMENT & PLAN NOTE
Reviewed her BP log, to continue to monitor, work on diet, switch her back to Lisinopril HCTZ 20/12.5 mg daily, reviewed her last labs

## 2024-06-06 ENCOUNTER — OFFICE VISIT (OUTPATIENT)
Dept: FAMILY MEDICINE CLINIC | Age: 59
End: 2024-06-06
Payer: COMMERCIAL

## 2024-06-06 VITALS
SYSTOLIC BLOOD PRESSURE: 136 MMHG | HEIGHT: 66 IN | HEART RATE: 70 BPM | BODY MASS INDEX: 21.57 KG/M2 | TEMPERATURE: 98.2 F | DIASTOLIC BLOOD PRESSURE: 83 MMHG | WEIGHT: 134.2 LBS

## 2024-06-06 DIAGNOSIS — G43.119 MIGRAINE WITH AURA, INTRACTABLE, WITHOUT STATUS MIGRAINOSUS: ICD-10-CM

## 2024-06-06 DIAGNOSIS — I10 ESSENTIAL (PRIMARY) HYPERTENSION: Primary | ICD-10-CM

## 2024-06-06 DIAGNOSIS — D17.0 BENIGN LIPOMATOUS NEOPLASM OF SKIN AND SUBCUTANEOUS TISSUE OF HEAD, FACE AND NECK: ICD-10-CM

## 2024-06-06 DIAGNOSIS — Z79.890 HORMONE REPLACEMENT THERAPY (POSTMENOPAUSAL): ICD-10-CM

## 2024-06-06 PROCEDURE — 99213 OFFICE O/P EST LOW 20 MIN: CPT | Performed by: NURSE PRACTITIONER

## 2024-06-06 RX ORDER — LISINOPRIL AND HYDROCHLOROTHIAZIDE 20; 12.5 MG/1; MG/1
1 TABLET ORAL DAILY
Qty: 90 TABLET | Refills: 0 | Status: SHIPPED | OUTPATIENT
Start: 2024-06-06

## 2024-06-06 RX ORDER — ESTRADIOL 0.5 MG/1
0.5 TABLET ORAL EVERY OTHER DAY
Qty: 45 TABLET | Refills: 0 | Status: SHIPPED | OUTPATIENT
Start: 2024-06-06

## 2024-06-06 RX ORDER — AMITRIPTYLINE HYDROCHLORIDE 25 MG/1
25 TABLET, FILM COATED ORAL NIGHTLY PRN
Qty: 90 TABLET | Refills: 0 | Status: SHIPPED | OUTPATIENT
Start: 2024-06-06

## 2024-06-06 NOTE — ASSESSMENT & PLAN NOTE
Hypertension is stable, repeat bp improved. Advised to monitor BP at home. Continue current med. Continue to modify diet and lifestyle. Return fasting for labs

## 2024-06-06 NOTE — PROGRESS NOTES
Chief Complaint  Hypertension (6 month follow up ) and HA rx and HRT refills    Subjective          Karen Vigil presents to [facility]    History of Present Illness  Hypertension:  Current medication: lisinopril / HCTZ   Tolerating Medication: Yes  Checking BP at home and it is: not checking   Needs refills: Yes to [facility]  Labs:  Lab Results       Component                Value               Date                       GLUCOSE                  94                  2023                 BUN                      9                   2023                 CREATININE               0.90                2023                 EGFRIFAFRI               76                  2021                 BCR                      10.0                2023                 K                        4.1                 2023                 CO2                      28.2                2023                 CALCIUM                  9.2                 2023                 ALBUMIN                  4.3                 2023                 AST                      21                  2023                 ALT                      16                  2023              Takes elavil for headaches three times a week with weather changes and then sometimes just weekly    Takes HRT QOD, last mammogram     Seen neph  in past     PAST MEDICAL HISTORY changes since 3-5-2024:       Renal insufficiency /sees nephDr Nuno     Hypertension     Endometriosis     wears hearing aids/bilateral         GYNECOLOGICAL HISTORY:     miscarriage 1         PREVENTIVE HEALTH MAINTENANCE             COLORECTAL CANCER SCREENING: Up to date (colonoscopy q10y; sigmoidoscopy q5y; Cologuard q3y) was last done , Results are in chart     PAP SMEAR: no longer due to history/ hysterectomy         Surgical History:         Hysterectomy: TAHBSO;     tympanoplasty L ear  with hearing loss;         Family History:       Father:  at age 50's; Cause of death was MI     Mother: Hypertension;  Hyperthyroidism     Brother(s): 1 brother(s) total;  Hypertension     Son(s): Healthy; 1 son(s) total     Daughter(s): 1 daughter(s) total     Maternal Grandmother: Hypertension         Social History:       Occupation: [employer]  ([place])  works from home / started     Marital Status:  and now remrried     Children: 2 children              Past Medical History:   Diagnosis Date    Abnormal results of kidney function studies     Allergic rhinitis     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, face and neck     Endometriosis     Essential (primary) hypertension     Hormone replacement therapy (postmenopausal)     Migraine with aura, intractable, without status migrainosus     Radiculopathy, lumbar region     Wears hearing aid in both ears        Allergies   Allergen Reactions    Conjugated Estrogens Unknown - Low Severity        Past Surgical History:   Procedure Laterality Date    HYSTERECTOMY      TAHBSO    TYMPANOPLASTY      LEFT EAR WITH HEARING LOSS        Social History     Tobacco Use    Smoking status: Never    Smokeless tobacco: Never   Substance Use Topics    Alcohol use: Never       Family History   Problem Relation Age of Onset    Hypertension Mother     Hyperthyroidism Mother     Heart attack Father     Hypertension Maternal Grandmother         Health Maintenance Due   Topic Date Due    ZOSTER VACCINE (1 of 2) Never done    ANNUAL PHYSICAL  Never done    COVID-19 Vaccine (2023- season) 2023        Current Outpatient Medications on File Prior to Visit   Medication Sig    Multiple Vitamins-Minerals (MULTIVITAMIN ADULT, MINERALS, PO) Take 1 tablet by mouth Daily.    Omega-3 Fatty Acids (Fish Oil) 1200 MG capsule delayed-release capsule Take 1 capsule by mouth Daily.    [DISCONTINUED] amitriptyline (ELAVIL) 25 MG tablet Take 1 tablet by  "mouth At Night As Needed for Sleep.    [DISCONTINUED] estradiol (ESTRACE) 0.5 MG tablet Take 1 tablet by mouth Every Other Day.    [DISCONTINUED] lisinopril-hydrochlorothiazide (PRINZIDE,ZESTORETIC) 20-12.5 MG per tablet Take 1 tablet by mouth Daily.     No current facility-administered medications on file prior to visit.       Immunization History   Administered Date(s) Administered    COVID-19 (PFIZER) BIVALENT 12+YRS 11/05/2022    COVID-19 (PFIZER) Purple Cap Monovalent 04/12/2021, 05/03/2021    Fluzone (or Fluarix & Flulaval for VFC) >6mos 10/20/2021    Influenza, Unspecified 10/09/2023    Tdap 11/27/2019       Review of Systems   Constitutional:  Negative for fatigue and fever.   Respiratory:  Negative for cough and shortness of breath.    Cardiovascular:  Negative for chest pain, palpitations and leg swelling.        Objective     Vitals:    06/06/24 1356 06/06/24 1408   BP: 158/84 136/83   BP Location: Right arm Right arm   Patient Position: Sitting Sitting   Cuff Size: Adult Adult   Pulse: 67 70   Temp: 98.2 °F (36.8 °C)    TempSrc: Oral    Weight: 60.9 kg (134 lb 3.2 oz)    Height: 166.4 cm (65.5\")             Physical Exam  Vitals reviewed.   Constitutional:       General: She is not in acute distress.     Appearance: Normal appearance.   Neck:      Vascular: No carotid bruit.   Cardiovascular:      Rate and Rhythm: Normal rate and regular rhythm.      Heart sounds: Normal heart sounds. No murmur heard.  Pulmonary:      Effort: Pulmonary effort is normal. No respiratory distress.      Breath sounds: Normal breath sounds.   Musculoskeletal:      Right lower leg: No edema.      Left lower leg: No edema.   Neurological:      Mental Status: She is alert.   Psychiatric:         Mood and Affect: Mood normal.         Behavior: Behavior normal.         Result Review :     The following data was reviewed by: ADI De La O on 06/06/2024:                       Assessment and Plan      Diagnoses and all " orders for this visit:    1. Essential (primary) hypertension (Primary)  Assessment & Plan:  Hypertension is stable, repeat bp improved. Advised to monitor BP at home. Continue current med. Continue to modify diet and lifestyle. Return fasting for labs        Orders:  -     Comprehensive Metabolic Panel; Future  -     Lipid Panel  -     lisinopril-hydrochlorothiazide (PRINZIDE,ZESTORETIC) 20-12.5 MG per tablet; Take 1 tablet by mouth Daily.  Dispense: 90 tablet; Refill: 0    2. 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 Migraine with aura, intractable, without status migrainosus  -     amitriptyline (ELAVIL) 25 MG tablet; Take 1 tablet by mouth At Night As Needed (headaches).  Dispense: 90 tablet; Refill: 0    4. Hormone replacement therapy (postmenopausal)  Assessment & Plan:  Wants to continue HRT, reviewed rx     Orders:  -     estradiol (ESTRACE) 0.5 MG tablet; Take 1 tablet by mouth Every Other Day.  Dispense: 45 tablet; Refill: 0        BMI is within normal parameters. No other follow-up for BMI required.           Follow Up     Return for fasting for labs.    Patient was given instructions and counseling regarding her condition or for health maintenance advice. Please see specific information pulled into the AVS if appropriate.

## 2024-06-12 ENCOUNTER — LAB (OUTPATIENT)
Dept: LAB | Facility: HOSPITAL | Age: 59
End: 2024-06-12
Payer: COMMERCIAL

## 2024-06-12 DIAGNOSIS — I10 ESSENTIAL (PRIMARY) HYPERTENSION: ICD-10-CM

## 2024-06-12 LAB
ALBUMIN SERPL-MCNC: 4.3 G/DL (ref 3.5–5.2)
ALBUMIN/GLOB SERPL: 1.7 G/DL
ALP SERPL-CCNC: 53 U/L (ref 39–117)
ALT SERPL W P-5'-P-CCNC: 23 U/L (ref 1–33)
ANION GAP SERPL CALCULATED.3IONS-SCNC: 9 MMOL/L (ref 5–15)
AST SERPL-CCNC: 21 U/L (ref 1–32)
BILIRUB SERPL-MCNC: 0.2 MG/DL (ref 0–1.2)
BUN SERPL-MCNC: 15 MG/DL (ref 6–20)
BUN/CREAT SERPL: 15.2 (ref 7–25)
CALCIUM SPEC-SCNC: 9.3 MG/DL (ref 8.6–10.5)
CHLORIDE SERPL-SCNC: 104 MMOL/L (ref 98–107)
CHOLEST SERPL-MCNC: 153 MG/DL (ref 0–200)
CO2 SERPL-SCNC: 29 MMOL/L (ref 22–29)
CREAT SERPL-MCNC: 0.99 MG/DL (ref 0.57–1)
EGFRCR SERPLBLD CKD-EPI 2021: 66.6 ML/MIN/1.73
GLOBULIN UR ELPH-MCNC: 2.6 GM/DL
GLUCOSE SERPL-MCNC: 101 MG/DL (ref 65–99)
HDLC SERPL-MCNC: 51 MG/DL (ref 40–60)
LDLC SERPL CALC-MCNC: 67 MG/DL (ref 0–100)
LDLC/HDLC SERPL: 1.17 {RATIO}
POTASSIUM SERPL-SCNC: 4.3 MMOL/L (ref 3.5–5.2)
PROT SERPL-MCNC: 6.9 G/DL (ref 6–8.5)
SODIUM SERPL-SCNC: 142 MMOL/L (ref 136–145)
TRIGL SERPL-MCNC: 211 MG/DL (ref 0–150)
VLDLC SERPL-MCNC: 35 MG/DL (ref 5–40)

## 2024-06-12 PROCEDURE — 80053 COMPREHEN METABOLIC PANEL: CPT

## 2024-06-12 PROCEDURE — 36415 COLL VENOUS BLD VENIPUNCTURE: CPT

## 2024-06-12 PROCEDURE — 80061 LIPID PANEL: CPT | Performed by: NURSE PRACTITIONER

## 2024-06-13 ENCOUNTER — OFFICE VISIT (OUTPATIENT)
Dept: SURGERY | Facility: CLINIC | Age: 59
End: 2024-06-13
Payer: COMMERCIAL

## 2024-06-13 VITALS
SYSTOLIC BLOOD PRESSURE: 130 MMHG | DIASTOLIC BLOOD PRESSURE: 75 MMHG | WEIGHT: 134.8 LBS | HEIGHT: 66 IN | BODY MASS INDEX: 21.66 KG/M2

## 2024-06-13 DIAGNOSIS — D17.1 LIPOMA OF BACK: Primary | ICD-10-CM

## 2024-06-13 PROCEDURE — 99203 OFFICE O/P NEW LOW 30 MIN: CPT | Performed by: SURGERY

## 2024-06-15 NOTE — PROGRESS NOTES
ASSESSMENT/PLAN:    58-year-old lady with tender enlarging right upper back probable subcutaneous lipoma.  Based on size, symptoms, and enlargement, recommended proceeding with excision under monitored sedation.  She will schedule accordingly.    CC:     Lipoma 58-year-old lady presents with    HPI:    Right upper back palpable mass that causes mild discomfort and has been enlarging.  Relevant review of systems negative other than presenting complaints.    ENDOSCOPY:   Colonoscopy 7/27/2016 Dr. Kade Lock's, report reviewed, findings normal    SOCIAL HISTORY:   Denies tobacco use  Denies alcohol use    FAMILY HISTORY:    Colorectal cancer: Negative    PREVIOUS ABDOMINAL SURGERY    Hysterectomy    PAST MEDICAL HISTORY:    Hypertension    MEDICATIONS:   Amitriptyline  Estradiol  Prinzide  Multivitamin  Fish oil    ALLERGIES:   Conjugated estrogens    PHYSICAL EXAM:   Constitutional: No acute distress  Vital signs:   Weight: 134 pounds  Height: 65 inches  BMI: 22  Respiratory: Normal nonlabored inspiratory effort  Cardiovascular: Regular rate, no jugular venous distention  Skin: Right upper back 3 cm lipoma just inferior to the scapula.  Freely movable, likely subcutaneous but intramuscular component cannot be ruled out on exam lungs.    JAIDA PERKINS M.D.

## 2024-06-19 ENCOUNTER — TELEPHONE (OUTPATIENT)
Dept: FAMILY MEDICINE CLINIC | Age: 59
End: 2024-06-19
Payer: COMMERCIAL

## 2024-06-19 NOTE — TELEPHONE ENCOUNTER
Patient last seen 6/6/24 for check up. States she has been having allergy symptoms of watery, itching eyes, sneezing. Advise she would need to be seen but patient stated she was just seen on 6/6/24. Advised pt that was not for allergy issues.     Please advise and thank you

## 2024-06-19 NOTE — TELEPHONE ENCOUNTER
Give her good nursing advise, to try allegra or zyrtec, can try flonase etc, and what symptoms to watch for that would require being seen

## 2024-06-19 NOTE — TELEPHONE ENCOUNTER
Caller: Karen Vigil    Relationship: Self    Best call back number: [phone]     What is the best time to reach you: ANY    Who are you requesting to speak with (clinical staff, provider,  specific staff member): CLINICAL      What was the call regarding: PATIENT WANTS TO TALK WITH NURSE TO SEE IF SHE CAN GET A STEROID INJECTION FOR HER ALLERGIES    PLEASE ADVISE

## 2024-07-10 ENCOUNTER — PRE-ADMISSION TESTING (OUTPATIENT)
Dept: PREADMISSION TESTING | Facility: HOSPITAL | Age: 59
End: 2024-07-10
Payer: COMMERCIAL

## 2024-07-10 VITALS
WEIGHT: 135 LBS | TEMPERATURE: 98 F | SYSTOLIC BLOOD PRESSURE: 138 MMHG | HEIGHT: 64 IN | OXYGEN SATURATION: 93 % | DIASTOLIC BLOOD PRESSURE: 82 MMHG | BODY MASS INDEX: 23.05 KG/M2 | RESPIRATION RATE: 18 BRPM | HEART RATE: 69 BPM

## 2024-07-10 LAB
ANION GAP SERPL CALCULATED.3IONS-SCNC: 10.3 MMOL/L (ref 5–15)
BUN SERPL-MCNC: 12 MG/DL (ref 6–20)
BUN/CREAT SERPL: 11 (ref 7–25)
CALCIUM SPEC-SCNC: 8.9 MG/DL (ref 8.6–10.5)
CHLORIDE SERPL-SCNC: 102 MMOL/L (ref 98–107)
CO2 SERPL-SCNC: 27.7 MMOL/L (ref 22–29)
CREAT SERPL-MCNC: 1.09 MG/DL (ref 0.57–1)
DEPRECATED RDW RBC AUTO: 38.3 FL (ref 37–54)
EGFRCR SERPLBLD CKD-EPI 2021: 59.4 ML/MIN/1.73
ERYTHROCYTE [DISTWIDTH] IN BLOOD BY AUTOMATED COUNT: 13.4 % (ref 12.3–15.4)
GLUCOSE SERPL-MCNC: 97 MG/DL (ref 65–99)
HCT VFR BLD AUTO: 36.8 % (ref 34–46.6)
HGB BLD-MCNC: 11.9 G/DL (ref 12–15.9)
MCH RBC QN AUTO: 26 PG (ref 26.6–33)
MCHC RBC AUTO-ENTMCNC: 32.3 G/DL (ref 31.5–35.7)
MCV RBC AUTO: 80.3 FL (ref 79–97)
PLATELET # BLD AUTO: 342 10*3/MM3 (ref 140–450)
PMV BLD AUTO: 9.3 FL (ref 6–12)
POTASSIUM SERPL-SCNC: 3.7 MMOL/L (ref 3.5–5.2)
RBC # BLD AUTO: 4.58 10*6/MM3 (ref 3.77–5.28)
SODIUM SERPL-SCNC: 140 MMOL/L (ref 136–145)
WBC NRBC COR # BLD AUTO: 7.57 10*3/MM3 (ref 3.4–10.8)

## 2024-07-10 PROCEDURE — 80048 BASIC METABOLIC PNL TOTAL CA: CPT

## 2024-07-10 PROCEDURE — 85027 COMPLETE CBC AUTOMATED: CPT

## 2024-07-10 PROCEDURE — 36415 COLL VENOUS BLD VENIPUNCTURE: CPT

## 2024-07-10 RX ORDER — FLUTICASONE PROPIONATE 50 MCG
2 SPRAY, SUSPENSION (ML) NASAL DAILY
COMMUNITY

## 2024-07-10 RX ORDER — FEXOFENADINE HCL 180 MG/1
180 TABLET ORAL DAILY
COMMUNITY

## 2024-07-10 NOTE — DISCHARGE INSTRUCTIONS
Take the following medications the morning of surgery:  NONE      If you are on prescription narcotic pain medication to control your pain you may also take that medication the morning of surgery.    General Instructions:  Do not eat solid food after midnight the night before surgery.  You may drink clear liquids day of surgery but must stop at least one hour before your hospital arrival time.  It is beneficial for you to have a clear drink that contains carbohydrates the day of surgery.  We suggest a 12 to 20 ounce bottle of Gatorade or Powerade for non-diabetic patients or a 12 to 20 ounce bottle of G2 or Powerade Zero for diabetic patients. (Pediatric patients, are not advised to drink a 12 to 20 ounce carbohydrate drink)    Clear liquids are liquids you can see through.  Nothing red in color.     Plain water                               Sports drinks  Sodas                                   Gelatin (Jell-O)  Fruit juices without pulp such as white grape juice and apple juice  Popsicles that contain no fruit or yogurt  Tea or coffee (no cream or milk added)  Gatorade / Powerade  G2 / Powerade Zero    Infants may have breast milk up to four hours before surgery.  Infants drinking formula may drink formula up to six hours before surgery.   Patients who avoid smoking, chewing tobacco and alcohol for 4 weeks prior to surgery have a reduced risk of post-operative complications.  Quit smoking as many days before surgery as you can.  Do not smoke, use chewing tobacco or drink alcohol the day of surgery.   If applicable bring your C-PAP/ BI-PAP machine in with you to preop day of surgery.  Bring any papers given to you in the doctor’s office.  Wear clean comfortable clothes.  Do not wear contact lenses, false eyelashes or make-up.  Bring a case for your glasses.   Bring crutches or walker if applicable.  Remove all piercings.  Leave jewelry and any other valuables at home.  Hair extensions with metal clips must be  removed prior to surgery.  The Pre-Admission Testing nurse will instruct you to bring medications if unable to obtain an accurate list in Pre-Admission Testing.        If you were given a blood bank ID arm band remember to bring it with you the day of surgery.    Preventing a Surgical Site Infection:  For 2 to 3 days before surgery, avoid shaving with a razor because the razor can irritate skin and make it easier to develop an infection.    Any areas of open skin can increase the risk of a post-operative wound infection by allowing bacteria to enter and travel throughout the body.  Notify your surgeon if you have any skin wounds / rashes even if it is not near the expected surgical site.  The area will need assessed to determine if surgery should be delayed until it is healed.  The night prior to surgery shower using a fresh bar of anti-bacterial soap (such as Dial) and clean washcloth.  Sleep in a clean bed with clean clothing.  Do not allow pets to sleep with you.  Shower on the morning of surgery using a fresh bar of anti-bacterial soap (such as Dial) and clean washcloth.  Dry with a clean towel and dress in clean clothing.  Ask your surgeon if you will be receiving antibiotics prior to surgery.  Make sure you, your family, and all healthcare providers clean their hands with soap and water or an alcohol based hand  before caring for you or your wound.    Day of surgery:  Your arrival time is approximately two hours before your scheduled surgery time.  Upon arrival, a Pre-op nurse and Anesthesiologist will review your health history, obtain vital signs, and answer questions you may have.  The only belongings needed at this time will be a list of your home medications and if applicable your C-PAP/BI-PAP machine.  A Pre-op nurse will start an IV and you may receive medication in preparation for surgery, including something to help you relax.     Please be aware that surgery does come with discomfort.  We  want to make every effort to control your discomfort so please discuss any uncontrolled symptoms with your nurse.   Your doctor will most likely have prescribed pain medications.      If you are going home after surgery you will receive individualized written care instructions before being discharged.  A responsible adult must drive you to and from the hospital on the day of your surgery and ideally stay with you through the night.   .  Discharge prescriptions can be filled by the hospital pharmacy during regular pharmacy hours.  If you are having surgery late in the day/evening your prescription may be e-prescribed to your pharmacy.  Please verify your pharmacy hours or chose a 24 hour pharmacy to avoid not having access to your prescription because your pharmacy has closed for the day.    If you are staying overnight following surgery, you will be transported to your hospital room following the recovery period.  [facility] has all private rooms.    If you have any questions please call Pre-Admission Testing at [phone].  Deductibles and co-payments are collected on the day of service. Please be prepared to pay the required co-pay, deductible or deposit on the day of service as defined by your plan.    Call your surgeon immediately if you experience any of the following symptoms:  Sore Throat  Shortness of Breath or difficulty breathing  Cough  Chills  Body soreness or muscle pain  Headache  Fever  New loss of taste or smell  Do not arrive for your surgery ill.  Your procedure will need to be rescheduled to another time.  You will need to call your physician before the day of surgery to avoid any unnecessary exposure to hospital staff as well as other patients.

## 2024-08-01 ENCOUNTER — PREP FOR SURGERY (OUTPATIENT)
Dept: OTHER | Facility: HOSPITAL | Age: 59
End: 2024-08-01
Payer: COMMERCIAL

## 2024-08-01 NOTE — PRE-PROCEDURE INSTRUCTIONS
History updated by phone w/patient. Preop instructions reviewed and instructed clears until 2 hrs prior to arrival time dos, voiced understanding.

## 2024-08-02 ENCOUNTER — ANESTHESIA EVENT (OUTPATIENT)
Dept: PERIOP | Facility: HOSPITAL | Age: 59
End: 2024-08-02
Payer: COMMERCIAL

## 2024-08-05 ENCOUNTER — ANESTHESIA (OUTPATIENT)
Dept: PERIOP | Facility: HOSPITAL | Age: 59
End: 2024-08-05
Payer: COMMERCIAL

## 2024-08-05 ENCOUNTER — HOSPITAL ENCOUNTER (OUTPATIENT)
Facility: HOSPITAL | Age: 59
Setting detail: HOSPITAL OUTPATIENT SURGERY
Discharge: HOME | End: 2024-08-05
Attending: SURGERY | Admitting: SURGERY
Payer: COMMERCIAL

## 2024-08-05 VITALS
SYSTOLIC BLOOD PRESSURE: 120 MMHG | TEMPERATURE: 97.9 F | DIASTOLIC BLOOD PRESSURE: 71 MMHG | BODY MASS INDEX: 22.81 KG/M2 | OXYGEN SATURATION: 100 % | RESPIRATION RATE: 16 BRPM | HEART RATE: 73 BPM | HEIGHT: 64 IN | WEIGHT: 133.6 LBS

## 2024-08-05 DIAGNOSIS — D17.1 LIPOMA OF BACK: ICD-10-CM

## 2024-08-05 PROCEDURE — 25010000002 ONDANSETRON PER 1 MG: Performed by: NURSE ANESTHETIST, CERTIFIED REGISTERED

## 2024-08-05 PROCEDURE — 25810000003 LACTATED RINGERS PER 1000 ML: Performed by: NURSE ANESTHETIST, CERTIFIED REGISTERED

## 2024-08-05 PROCEDURE — 88304 TISSUE EXAM BY PATHOLOGIST: CPT | Performed by: SURGERY

## 2024-08-05 PROCEDURE — 25010000002 PROPOFOL 200 MG/20ML EMULSION: Performed by: NURSE ANESTHETIST, CERTIFIED REGISTERED

## 2024-08-05 PROCEDURE — 25010000002 DEXAMETHASONE PER 1 MG: Performed by: NURSE ANESTHETIST, CERTIFIED REGISTERED

## 2024-08-05 PROCEDURE — 21931 EXC BACK LES SC 3 CM/>: CPT | Performed by: SURGERY

## 2024-08-05 PROCEDURE — S0260 H&P FOR SURGERY: HCPCS | Performed by: SURGERY

## 2024-08-05 PROCEDURE — 25010000002 MIDAZOLAM PER 1MG: Performed by: NURSE ANESTHETIST, CERTIFIED REGISTERED

## 2024-08-05 RX ORDER — LIDOCAINE HYDROCHLORIDE AND EPINEPHRINE 10; 10 MG/ML; UG/ML
INJECTION, SOLUTION INFILTRATION; PERINEURAL AS NEEDED
Status: DISCONTINUED | OUTPATIENT
Start: 2024-08-05 | End: 2024-08-05 | Stop reason: HOSPADM

## 2024-08-05 RX ORDER — HYDROCODONE BITARTRATE AND ACETAMINOPHEN 5; 325 MG/1; MG/1
1 TABLET ORAL ONCE AS NEEDED
Status: DISCONTINUED | OUTPATIENT
Start: 2024-08-05 | End: 2024-08-05 | Stop reason: HOSPADM

## 2024-08-05 RX ORDER — DEXAMETHASONE SODIUM PHOSPHATE 10 MG/ML
8 INJECTION INTRAMUSCULAR; INTRAVENOUS ONCE AS NEEDED
Status: COMPLETED | OUTPATIENT
Start: 2024-08-05 | End: 2024-08-05

## 2024-08-05 RX ORDER — TRAMADOL HYDROCHLORIDE 50 MG/1
50 TABLET ORAL EVERY 6 HOURS PRN
Qty: 8 TABLET | Refills: 0 | Status: SHIPPED | OUTPATIENT
Start: 2024-08-05

## 2024-08-05 RX ORDER — KETAMINE HYDROCHLORIDE 10 MG/ML
INJECTION, SOLUTION INTRAMUSCULAR; INTRAVENOUS AS NEEDED
Status: DISCONTINUED | OUTPATIENT
Start: 2024-08-05 | End: 2024-08-05 | Stop reason: SURG

## 2024-08-05 RX ORDER — MIDAZOLAM HYDROCHLORIDE 2 MG/2ML
1 INJECTION, SOLUTION INTRAMUSCULAR; INTRAVENOUS
Status: DISCONTINUED | OUTPATIENT
Start: 2024-08-05 | End: 2024-08-05 | Stop reason: HOSPADM

## 2024-08-05 RX ORDER — LIDOCAINE HYDROCHLORIDE 10 MG/ML
0.5 INJECTION, SOLUTION INFILTRATION; PERINEURAL ONCE AS NEEDED
Status: DISCONTINUED | OUTPATIENT
Start: 2024-08-05 | End: 2024-08-05 | Stop reason: HOSPADM

## 2024-08-05 RX ORDER — ONDANSETRON 2 MG/ML
4 INJECTION INTRAMUSCULAR; INTRAVENOUS ONCE AS NEEDED
Status: COMPLETED | OUTPATIENT
Start: 2024-08-05 | End: 2024-08-05

## 2024-08-05 RX ORDER — SODIUM CHLORIDE 0.9 % (FLUSH) 0.9 %
10 SYRINGE (ML) INJECTION AS NEEDED
Status: DISCONTINUED | OUTPATIENT
Start: 2024-08-05 | End: 2024-08-05 | Stop reason: HOSPADM

## 2024-08-05 RX ORDER — LIDOCAINE HYDROCHLORIDE 20 MG/ML
INJECTION, SOLUTION INFILTRATION; PERINEURAL AS NEEDED
Status: DISCONTINUED | OUTPATIENT
Start: 2024-08-05 | End: 2024-08-05 | Stop reason: SURG

## 2024-08-05 RX ORDER — SODIUM CHLORIDE 9 MG/ML
40 INJECTION, SOLUTION INTRAVENOUS AS NEEDED
Status: DISCONTINUED | OUTPATIENT
Start: 2024-08-05 | End: 2024-08-05 | Stop reason: HOSPADM

## 2024-08-05 RX ORDER — ONDANSETRON 4 MG/1
4 TABLET, FILM COATED ORAL EVERY 6 HOURS PRN
Qty: 10 TABLET | Refills: 0 | Status: SHIPPED | OUTPATIENT
Start: 2024-08-05

## 2024-08-05 RX ORDER — SODIUM CHLORIDE, SODIUM LACTATE, POTASSIUM CHLORIDE, CALCIUM CHLORIDE 600; 310; 30; 20 MG/100ML; MG/100ML; MG/100ML; MG/100ML
100 INJECTION, SOLUTION INTRAVENOUS ONCE
Status: DISCONTINUED | OUTPATIENT
Start: 2024-08-05 | End: 2024-08-05 | Stop reason: HOSPADM

## 2024-08-05 RX ORDER — PROPOFOL 10 MG/ML
INJECTION, EMULSION INTRAVENOUS AS NEEDED
Status: DISCONTINUED | OUTPATIENT
Start: 2024-08-05 | End: 2024-08-05 | Stop reason: SURG

## 2024-08-05 RX ORDER — ONDANSETRON 2 MG/ML
4 INJECTION INTRAMUSCULAR; INTRAVENOUS ONCE AS NEEDED
Status: DISCONTINUED | OUTPATIENT
Start: 2024-08-05 | End: 2024-08-05 | Stop reason: HOSPADM

## 2024-08-05 RX ORDER — SODIUM CHLORIDE, SODIUM LACTATE, POTASSIUM CHLORIDE, CALCIUM CHLORIDE 600; 310; 30; 20 MG/100ML; MG/100ML; MG/100ML; MG/100ML
9 INJECTION, SOLUTION INTRAVENOUS CONTINUOUS
Status: DISCONTINUED | OUTPATIENT
Start: 2024-08-05 | End: 2024-08-05 | Stop reason: HOSPADM

## 2024-08-05 RX ORDER — FAMOTIDINE 10 MG/ML
20 INJECTION, SOLUTION INTRAVENOUS
Status: COMPLETED | OUTPATIENT
Start: 2024-08-05 | End: 2024-08-05

## 2024-08-05 RX ORDER — DEXMEDETOMIDINE HYDROCHLORIDE 100 UG/ML
INJECTION, SOLUTION INTRAVENOUS AS NEEDED
Status: DISCONTINUED | OUTPATIENT
Start: 2024-08-05 | End: 2024-08-05 | Stop reason: SURG

## 2024-08-05 RX ORDER — SODIUM CHLORIDE 0.9 % (FLUSH) 0.9 %
10 SYRINGE (ML) INJECTION EVERY 12 HOURS SCHEDULED
Status: DISCONTINUED | OUTPATIENT
Start: 2024-08-05 | End: 2024-08-05 | Stop reason: HOSPADM

## 2024-08-05 RX ADMIN — DEXMEDETOMIDINE 6 MCG: 200 INJECTION, SOLUTION INTRAVENOUS at 10:15

## 2024-08-05 RX ADMIN — MIDAZOLAM HYDROCHLORIDE 1 MG: 1 INJECTION, SOLUTION INTRAMUSCULAR; INTRAVENOUS at 09:04

## 2024-08-05 RX ADMIN — FAMOTIDINE 20 MG: 10 INJECTION, SOLUTION INTRAVENOUS at 08:55

## 2024-08-05 RX ADMIN — SODIUM CHLORIDE, POTASSIUM CHLORIDE, SODIUM LACTATE AND CALCIUM CHLORIDE 9 ML/HR: 600; 310; 30; 20 INJECTION, SOLUTION INTRAVENOUS at 07:15

## 2024-08-05 RX ADMIN — PROPOFOL 60 MG: 10 INJECTION, EMULSION INTRAVENOUS at 10:19

## 2024-08-05 RX ADMIN — PROPOFOL 125 MCG/KG/MIN: 10 INJECTION, EMULSION INTRAVENOUS at 10:22

## 2024-08-05 RX ADMIN — ONDANSETRON 4 MG: 2 INJECTION INTRAMUSCULAR; INTRAVENOUS at 08:55

## 2024-08-05 RX ADMIN — DEXAMETHASONE SODIUM PHOSPHATE 8 MG: 10 INJECTION INTRAMUSCULAR; INTRAVENOUS at 08:55

## 2024-08-05 RX ADMIN — LIDOCAINE HYDROCHLORIDE 60 MG: 20 INJECTION, SOLUTION INFILTRATION; PERINEURAL at 10:19

## 2024-08-05 RX ADMIN — KETAMINE HYDROCHLORIDE 20 MG: 10 INJECTION, SOLUTION INTRAMUSCULAR; INTRAVENOUS at 10:19

## 2024-08-05 NOTE — H&P
ASSESSMENT/PLAN:    58-year-old lady with tender enlarging right upper back probable subcutaneous lipoma.  Wishes to proceed with excision today.    CC:     Lipoma    HPI:    58-year-old lady presents with right upper back palpable mass that causes mild discomfort and has been enlarging.  Relevant review of systems negative other than presenting complaints.    ENDOSCOPY:   Colonoscopy 7/27/2016 Dr. Kade Lock's, report reviewed, findings normal    SOCIAL HISTORY:   Denies tobacco use  Denies alcohol use    FAMILY HISTORY:    Colorectal cancer: Negative    PREVIOUS ABDOMINAL SURGERY    Hysterectomy    PAST MEDICAL HISTORY:    Hypertension    MEDICATIONS:   Elavil  Estrace  Allegra  Flonase  Prinzide    ALLERGIES:   Conjugated estrogens    PHYSICAL EXAM:   Constitutional: No acute distress  Vital signs:   Weight: 133 pounds  Height: 64 inches  BMI: 22.9  Blood pressure 168/37  Heart rate 82  Respiratory rate 16  Temperature 97.6  Respiratory: Normal nonlabored inspiratory effort  Cardiovascular: Regular rate, no jugular venous distention  Gastrointestinal: Soft  Skin: Right upper back freely mobile probable subcutaneous 3 cm lipoma     JAIDA PERKINS M.D.

## 2024-08-05 NOTE — DISCHARGE INSTRUCTIONS
Dr. Akil Johnson  [address]  [place] [id number] [phone]    Discharge Instructions for Minor Surgery    Go home, rest and take it easy today; however, you should get up and move about several times today to reduce the risk of developing a clot in your legs.      You may experience some dizziness or memory loss from the anesthesia.  This may last for the next 24 hours.  Someone should plan on staying with you for the first 24 hours for your safety.    Do not make any important legal decisions or sign any legal papers for the next 24 hours.      Eat and drink lightly today.  Start off with liquids, jello, soup, crackers or other bland foods at first. You may advance your diet tomorrow as tolerated as long as you do not experience any nausea or vomiting.     If skin glue (Dermabond) was used, your incisions are protected and covered.  The invisible glue will dissolve on its own as your incision heals. If dressings were used, you may remove your outer dressings in 3-4 days.   Please leave the little white tapes known as steri-strips alone.  They usually fall off in 1-2 weeks.  Do not worry if they come off sooner.     If dressings were used, you may notice some bleeding/drainage on your outer dressings. A little bloody drainage is normal. If the bleeding/drainage is such that the bandage cannot absorb it, remove the dressing, apply clean gauze and apply firm pressure for a full 15 minutes.  If the bleeding continues, please call me.    You may shower tomorrow allowing water to run over your incisions; however, do not scrub your incisions.  No tub baths until your incisions are completely healed.    You have received a prescription for a narcotic pain medicine, as you may have some pain/discomfort following surgery.   You will not be totally pain free, but your pain medicine should make the pain tolerable.  Please take your pain medicine as prescribed and always take your pills with food to  prevent nausea. If you are having severe pain that cannot be controlled by the pain medicine, please contact me.  If the pain is such that narcotic pain medicine is not required, you may take Tylenol or Ibuprofen as directed unless indicated otherwise.      You have also received a prescription for an anti-nausea medicine.  Please take this as prescribed for any nausea or vomiting.  Nausea could be a result of the anesthesia or a result of the narcotic pain medicine.  If you experience severe nausea and vomiting that cannot be controlled by the nausea medicine, please call me.      No driving for 24 hours and for as long as you are taking your prescription pain medicine.      Please call the office at [phone] to schedule a follow-up in about 1 week.      Remember to contact me for any of the following:    Fever> 101 degrees  Severe pain that cannot be controlled by taking your pain pills  Severe nausea or vomiting that cannot be controlled by taking your nausea medicine  Significant bleeding from your incision  Drainage that has a bad smell or is yellow or green in appearance  Any other questions or concerns

## 2024-08-05 NOTE — OP NOTE
PREOPERATIVE DIAGNOSIS:  Subcutaneous lipoma right upper back    POSTOPERATIVE DIAGNOSIS (FINDINGS):  Same measuring 4 cm    PROCEDURE:  Excision of right upper back subcutaneous 4 cm lipoma     SURGEON:  Akil Johnson MD    ASSISTANT:  Pao Marcelino was responsible for performing the following activities: suction, irrigation, suturing, closing, retraction, and placing dressing, and their skilled assistance was necessary for the success of this case.    ANESTHESIA:  Monitored sedation    EBL:  Minimal    SPECIMEN(S):  Lipoma    DESCRIPTION:  In lateral position prepped and draped usual sterile manner.  1% lidocaine with epinephrine infiltrated locally.  Transverse incision made and carried down to the deep subcutaneous layer where the lipoma was encountered and completely excised intact with the electrocautery down to the level of the superficial muscular fascia.  Good hemostasis was ensured with electrocautery.  Subcutaneous layer closed with interrupted 3-0 Vicryl.  Dermis closed with interrupted 3-0 Vicryl.  Skin closed with 5-0 Vicryl subcuticular followed by Exofin.  Tolerated well.    Akil Johnson M.D.

## 2024-08-05 NOTE — ANESTHESIA POSTPROCEDURE EVALUATION
Patient: Karen Vigil    Procedure Summary       Date: 08/05/24 Room / Location:  [facility] /  [facility]    Anesthesia Start: 1012 Anesthesia Stop: 1044    Procedure: LIPOMA EXCISION Diagnosis:       Lipoma of back      (Lipoma of back [D17.1])    Surgeons: Akil Johnson MD Provider: Meenu Epperson CRNA    Anesthesia Type: MAC ASA Status: 2            Anesthesia Type: MAC    Vitals  Vitals Value Taken Time   /74 08/05/24 1125   Temp 97.9 °F (36.6 °C) 08/05/24 1044   Pulse 69 08/05/24 1128   Resp 18 08/05/24 1100   SpO2 99 % 08/05/24 1128   Vitals shown include unfiled device data.        Post Anesthesia Care and Evaluation    Patient location during evaluation: bedside  Patient participation: complete - patient participated  Level of consciousness: awake and alert  Pain score: 2  Pain management: adequate    Airway patency: patent  Anesthetic complications: No anesthetic complications  PONV Status: none  Cardiovascular status: acceptable  Respiratory status: acceptable  Hydration status: acceptable  No anesthesia care post op

## 2024-08-05 NOTE — ANESTHESIA PREPROCEDURE EVALUATION
Anesthesia Evaluation     Patient summary reviewed and Nursing notes reviewed   no history of anesthetic complications:   NPO Solid Status: > 8 hours  NPO Liquid Status: > 8 hours           Airway   Mallampati: II  TM distance: <3 FB  Neck ROM: full  No difficulty expected  Dental - normal exam     Pulmonary - negative pulmonary ROS and normal exam    breath sounds clear to auscultation  Cardiovascular - normal exam  Exercise tolerance: good (4-7 METS)    Rhythm: regular  Rate: normal    (+) hypertension      Neuro/Psych  (+) headaches  (-) numbness  GI/Hepatic/Renal/Endo - negative ROS     Musculoskeletal (-) negative ROS    Abdominal  - normal exam   Substance History - negative use     OB/GYN negative ob/gyn ROS         Other - negative ROS                   Anesthesia Plan    ASA 2     MAC     intravenous induction     Anesthetic plan, risks, benefits, and alternatives have been provided, discussed and informed consent has been obtained with: patient.  Pre-procedure education provided  Use of blood products discussed with patient  Consented to blood products.    Plan discussed with CRNA.    CODE STATUS:

## 2024-08-06 ENCOUNTER — TELEPHONE (OUTPATIENT)
Dept: SURGERY | Facility: CLINIC | Age: 59
End: 2024-08-06
Payer: COMMERCIAL

## 2024-08-06 NOTE — TELEPHONE ENCOUNTER
Patient called requesting a return to work letters s/p lipoma excision 8/6/24. She states that she spoke to someone in our office who recommended that she take this week off and that she can return to working from home on Monday 8/12. Pt primarily works from home but does go in to the office every now and then. She states she was advised to work from home for 2 weeks and that she can return to the office on 8/26. Advised that she can return to the office sooner than 8/26 if she would like to. Pt would prefer to work from home for 2 weeks. Letter sent to patient via [employer].

## 2024-08-07 LAB
LAB AP CASE REPORT: NORMAL
PATH REPORT.FINAL DX SPEC: NORMAL
PATH REPORT.GROSS SPEC: NORMAL

## 2024-08-08 ENCOUNTER — TELEPHONE (OUTPATIENT)
Dept: SURGERY | Facility: CLINIC | Age: 59
End: 2024-08-08
Payer: COMMERCIAL

## 2024-08-08 NOTE — TELEPHONE ENCOUNTER
----- Message from [employer] sent at 8/8/2024 10:51 AM EDT -----  Regarding: Surgical incision  Contact: [phone]  Hello, does this look okay?

## 2024-08-08 NOTE — TELEPHONE ENCOUNTER
Called and spoke with patient. Advised her that I don't see any signs of infection. Advised her to call back if anything changes with her incision.

## 2024-08-21 ENCOUNTER — OFFICE VISIT (OUTPATIENT)
Dept: SURGERY | Facility: CLINIC | Age: 59
End: 2024-08-21
Payer: COMMERCIAL

## 2024-08-21 VITALS
WEIGHT: 136.8 LBS | SYSTOLIC BLOOD PRESSURE: 150 MMHG | DIASTOLIC BLOOD PRESSURE: 82 MMHG | BODY MASS INDEX: 23.35 KG/M2 | HEIGHT: 64 IN

## 2024-08-21 DIAGNOSIS — Z09 ENCOUNTER FOR FOLLOW-UP: Primary | ICD-10-CM

## 2024-08-21 PROCEDURE — 99024 POSTOP FOLLOW-UP VISIT: CPT | Performed by: PHYSICIAN ASSISTANT

## 2024-08-21 NOTE — PROGRESS NOTES
I have reviewed the notes, assessments, and/or procedures performed by Rodolfo Yi, I concur with her/his documentation of Karen Vigil.

## 2024-08-21 NOTE — PROGRESS NOTES
58-year-old lady returning to the office today status post excision of upper mid back lipoma that was performed on 8/5/2024.  Overall she has been doing well since surgery with only a mild amount of discomfort which is improving.  She does have some slight swelling at the surgical site which I informed her was a small seroma which will take several weeks to resolve.  Her incision is healing very well.  Informed her that she may return to all activities as tolerated using her body as her guide.  Pathology did confirm this to be a benign lipoma.  All questions were answered and she was willing to proceed with all recommendations.    Rodolfo Yi PA-C

## 2024-09-04 DIAGNOSIS — I10 ESSENTIAL (PRIMARY) HYPERTENSION: ICD-10-CM

## 2024-09-04 RX ORDER — LISINOPRIL AND HYDROCHLOROTHIAZIDE 12.5; 2 MG/1; MG/1
1 TABLET ORAL DAILY
Qty: 90 TABLET | Refills: 0 | Status: SHIPPED | OUTPATIENT
Start: 2024-09-04

## 2024-09-10 ENCOUNTER — OFFICE VISIT (OUTPATIENT)
Dept: FAMILY MEDICINE CLINIC | Age: 59
End: 2024-09-10
Payer: COMMERCIAL

## 2024-09-10 VITALS
DIASTOLIC BLOOD PRESSURE: 83 MMHG | BODY MASS INDEX: 23.49 KG/M2 | HEART RATE: 73 BPM | WEIGHT: 137.6 LBS | SYSTOLIC BLOOD PRESSURE: 133 MMHG | TEMPERATURE: 97.7 F | HEIGHT: 64 IN

## 2024-09-10 DIAGNOSIS — Z00.00 ROUTINE GENERAL MEDICAL EXAMINATION AT A HEALTH CARE FACILITY: Primary | ICD-10-CM

## 2024-09-10 DIAGNOSIS — I10 ESSENTIAL (PRIMARY) HYPERTENSION: ICD-10-CM

## 2024-09-10 PROCEDURE — 99396 PREV VISIT EST AGE 40-64: CPT | Performed by: NURSE PRACTITIONER

## 2024-09-10 NOTE — ASSESSMENT & PLAN NOTE
Advise regular exercise, healthy eating, always wear seat belts.   Immunizations discussed, and advised:  covid and flu vaccines and shingrex.   To continue yearly optometry and dental exams.   continue monthly BSE, setting up mammogram when due, and she will return fasting for labs.

## 2024-09-10 NOTE — PROGRESS NOTES
Chief Complaint  Annual Exam    Subjective          Karen Vigil presents to [facility]    History of Present Illness  General Health Questions  Regular exercise as least 3 days a week: yes   Follows a healthy diet yes   Wears seat belt always: yes   Drinks Alcohol: none   Uses Recreational drugs: none   Uses tobacco products: none   UTD on Tetanus vaccine: 2019  Does BSE:yes   Last mammogram:2023  Last colon screen:      PAST MEDICAL HISTORY changes since 2024:       Renal insufficiency /sees neph, Dr Nuno     Hypertension     Endometriosis     wears hearing aids/bilateral         GYNECOLOGICAL HISTORY:     miscarriage 1         PREVENTIVE HEALTH MAINTENANCE             COLORECTAL CANCER SCREENING: Up to date (colonoscopy q10y; sigmoidoscopy q5y; Cologuard q3y) was last done , Results are in chart     PAP SMEAR: no longer due to history/ hysterectomy         Surgical History:       Lipoma back 2024    Hysterectomy: TAHBSO;     tympanoplasty L ear with hearing loss;         Family History:        Father:  at age 50's; Cause of death was MI     Mother: Hypertension;  Hyperthyroidism     Brother(s): 1 brother(s) total;  Hypertension     Son(s): Healthy; 1 son(s) total     Daughter(s): 1 daughter(s) total     Maternal Grandmother: Hypertension         Social History:        Occupation: [employer]  ([place])  works from home / started     Marital Status:  and now remrried     Children: 2 children                    Past Medical History:   Diagnosis Date    Abnormal results of kidney function studies     Allergic rhinitis     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, face and neck     Essential (primary) hypertension     Hormone replacement therapy (postmenopausal)     Lipoma of back     Migraine with aura, intractable, without status migrainosus     Radiculopathy, lumbar region     Wears hearing aid in both  ears        Allergies   Allergen Reactions    Conjugated Estrogens Unknown - Low Severity     PREMARIN        Past Surgical History:   Procedure Laterality Date    COLONOSCOPY      EXCISION MASS TRUNK N/A 8/5/2024    Procedure: LIPOMA EXCISION;  Surgeon: Akil Johnson MD;  Location: [facility];  Service: General;  Laterality: N/A;    HYSTERECTOMY      TAHBSO    TYMPANOPLASTY      LEFT EAR WITH HEARING LOSS        Social History     Tobacco Use    Smoking status: Never    Smokeless tobacco: Never   Substance Use Topics    Alcohol use: Never       Family History   Problem Relation Age of Onset    Hypertension Mother     Hyperthyroidism Mother     Heart attack Father     Hypertension Maternal Grandmother     Malig Hyperthermia Neg Hx         Health Maintenance Due   Topic Date Due    ZOSTER VACCINE (1 of 2) Never done    ANNUAL PHYSICAL  Never done    COVID-19 Vaccine (4 - 2023-24 season) 09/01/2024    INFLUENZA VACCINE  08/01/2024        Current Outpatient Medications on File Prior to Visit   Medication Sig    amitriptyline (ELAVIL) 25 MG tablet Take 1 tablet by mouth At Night As Needed (headaches).    estradiol (ESTRACE) 0.5 MG tablet Take 1 tablet by mouth Every Other Day.    fexofenadine (ALLEGRA) 180 MG tablet Take 1 tablet by mouth Daily.    lisinopril-hydrochlorothiazide (PRINZIDE,ZESTORETIC) 20-12.5 MG per tablet Take 1 tablet by mouth once daily    Omega-3 Fatty Acids (Fish Oil) 1200 MG capsule delayed-release capsule Take 1 capsule by mouth Daily. HOLD FOR SURGERY    fluticasone (FLONASE) 50 MCG/ACT nasal spray 2 sprays into the nostril(s) as directed by provider Daily. (Patient not taking: Reported on 9/10/2024)     No current facility-administered medications on file prior to visit.       Immunization History   Administered Date(s) Administered    COVID-19 (PFIZER) BIVALENT 12+YRS 11/05/2022    COVID-19 (PFIZER) Purple Cap Monovalent 04/12/2021, 05/03/2021    Fluzone (or Fluarix & Flulaval for VFC) >6mos  "10/20/2021    Influenza, Unspecified 10/09/2023    Tdap 11/27/2019       Review of Systems   Constitutional:  Negative for fatigue and fever.   HENT:  Negative for ear pain and sore throat.    Eyes:  Negative for blurred vision.   Respiratory:  Negative for cough and shortness of breath.    Cardiovascular:  Negative for chest pain, palpitations and leg swelling.   Gastrointestinal:  Negative for abdominal pain, constipation, diarrhea, nausea and vomiting.   Musculoskeletal:  Negative for arthralgias and myalgias.   Skin:  Negative for rash.   Neurological:  Negative for dizziness, weakness and headache.   Psychiatric/Behavioral:  Negative for sleep disturbance and depressed mood.         Objective     Vitals:    09/10/24 1628   BP: 133/83   BP Location: Right arm   Patient Position: Sitting   Cuff Size: Adult   Pulse: 73   Temp: 97.7 °F (36.5 °C)   TempSrc: Oral   Weight: 62.4 kg (137 lb 9.6 oz)   Height: 162.6 cm (64\")            Physical Exam  Vitals reviewed.   Constitutional:       General: She is not in acute distress.     Appearance: Normal appearance. She is well-developed.   HENT:      Head: Normocephalic and atraumatic. Hair is normal.      Right Ear: Hearing, tympanic membrane, ear canal and external ear normal. No decreased hearing noted. No drainage.      Left Ear: Hearing, tympanic membrane, ear canal and external ear normal. No decreased hearing noted.      Nose: Nose normal. No nasal deformity.      Mouth/Throat:      Mouth: Mucous membranes are moist.      Pharynx: No oropharyngeal exudate.   Eyes:      General: Lids are normal.      Extraocular Movements: Extraocular movements intact.      Conjunctiva/sclera: Conjunctivae normal.      Pupils: Pupils are equal, round, and reactive to light.   Neck:      Thyroid: No thyromegaly.      Vascular: No carotid bruit or JVD.   Cardiovascular:      Rate and Rhythm: Normal rate and regular rhythm.      Pulses: Normal pulses.      Heart sounds: Normal heart " sounds. No murmur heard.     No friction rub. No gallop.   Pulmonary:      Effort: Pulmonary effort is normal. No respiratory distress.      Breath sounds: Normal breath sounds. No wheezing or rhonchi.   Chest:   Breasts:     Right: Normal. No mass, nipple discharge or skin change.      Left: Normal. No mass, nipple discharge or skin change.   Abdominal:      General: Bowel sounds are normal. There is no distension.      Palpations: Abdomen is soft. There is no mass.      Tenderness: There is no abdominal tenderness.      Comments:       Musculoskeletal:         General: No tenderness or deformity. Normal range of motion.      Cervical back: Normal range of motion and neck supple.   Lymphadenopathy:      Cervical: No cervical adenopathy.      Upper Body:      Right upper body: No axillary adenopathy.      Left upper body: No axillary adenopathy.   Skin:     General: Skin is warm and dry.      Findings: No erythema or rash.   Neurological:      Mental Status: She is alert and oriented to person, place, and time.      Cranial Nerves: No cranial nerve deficit.      Motor: No abnormal muscle tone.      Gait: Gait normal.      Deep Tendon Reflexes: Reflexes are normal and symmetric.   Psychiatric:         Mood and Affect: Mood and affect normal.         Behavior: Behavior normal.         Thought Content: Thought content normal.         Judgment: Judgment normal.         Result Review :     The following data was reviewed by: ADI De La O on 09/10/2024:                       Assessment and Plan      Diagnoses and all orders for this visit:    1. Routine general medical examination at a health care facility (Primary)  Assessment & Plan:  Advise regular exercise, healthy eating, always wear seat belts.   Immunizations discussed, and advised:  covid and flu vaccines and shingrex.   To continue yearly optometry and dental exams.   continue monthly BSE, setting up mammogram when due, and she will return fasting  for labs.        Orders:  -     Comprehensive Metabolic Panel; Future  -     CBC & Differential; Future  -     TSH; Future  -     Lipid Panel; Future  -     Mammo Screening Digital Tomosynthesis Bilateral With CAD; Future    2. Essential (primary) hypertension  Assessment & Plan:  Hypertension is stable. She just got her bp rx.  Continue current med. Continue to modify diet and lifestyle. Reviewed last labs.             BMI is within normal parameters. No other follow-up for BMI required.           Follow Up     Return for fasting for labs.    Patient was given instructions and counseling regarding her condition or for health maintenance advice. Please see specific information pulled into the AVS if appropriate.

## 2024-10-08 ENCOUNTER — LAB (OUTPATIENT)
Dept: LAB | Facility: HOSPITAL | Age: 59
End: 2024-10-08
Payer: COMMERCIAL

## 2024-10-08 DIAGNOSIS — Z00.00 ROUTINE GENERAL MEDICAL EXAMINATION AT A HEALTH CARE FACILITY: ICD-10-CM

## 2024-10-08 LAB
ALBUMIN SERPL-MCNC: 4 G/DL (ref 3.5–5.2)
ALBUMIN/GLOB SERPL: 1.5 G/DL
ALP SERPL-CCNC: 57 U/L (ref 39–117)
ALT SERPL W P-5'-P-CCNC: 18 U/L (ref 1–33)
ANION GAP SERPL CALCULATED.3IONS-SCNC: 10 MMOL/L (ref 5–15)
AST SERPL-CCNC: 20 U/L (ref 1–32)
BASOPHILS # BLD AUTO: 0.02 10*3/MM3 (ref 0–0.2)
BASOPHILS NFR BLD AUTO: 0.3 % (ref 0–1.5)
BILIRUB SERPL-MCNC: 0.3 MG/DL (ref 0–1.2)
BUN SERPL-MCNC: 13 MG/DL (ref 6–20)
BUN/CREAT SERPL: 12.9 (ref 7–25)
CALCIUM SPEC-SCNC: 9.5 MG/DL (ref 8.6–10.5)
CHLORIDE SERPL-SCNC: 100 MMOL/L (ref 98–107)
CHOLEST SERPL-MCNC: 158 MG/DL (ref 0–200)
CO2 SERPL-SCNC: 29 MMOL/L (ref 22–29)
CREAT SERPL-MCNC: 1.01 MG/DL (ref 0.57–1)
DEPRECATED RDW RBC AUTO: 39 FL (ref 37–54)
EGFRCR SERPLBLD CKD-EPI 2021: 64.7 ML/MIN/1.73
EOSINOPHIL # BLD AUTO: 0.1 10*3/MM3 (ref 0–0.4)
EOSINOPHIL NFR BLD AUTO: 1.3 % (ref 0.3–6.2)
ERYTHROCYTE [DISTWIDTH] IN BLOOD BY AUTOMATED COUNT: 13 % (ref 12.3–15.4)
GLOBULIN UR ELPH-MCNC: 2.6 GM/DL
GLUCOSE SERPL-MCNC: 99 MG/DL (ref 65–99)
HCT VFR BLD AUTO: 38.3 % (ref 34–46.6)
HDLC SERPL-MCNC: 49 MG/DL (ref 40–60)
HGB BLD-MCNC: 12.3 G/DL (ref 12–15.9)
IMM GRANULOCYTES # BLD AUTO: 0.01 10*3/MM3 (ref 0–0.05)
IMM GRANULOCYTES NFR BLD AUTO: 0.1 % (ref 0–0.5)
LDLC SERPL CALC-MCNC: 78 MG/DL (ref 0–100)
LDLC/HDLC SERPL: 1.46 {RATIO}
LYMPHOCYTES # BLD AUTO: 3.45 10*3/MM3 (ref 0.7–3.1)
LYMPHOCYTES NFR BLD AUTO: 46 % (ref 19.6–45.3)
MCH RBC QN AUTO: 25.9 PG (ref 26.6–33)
MCHC RBC AUTO-ENTMCNC: 32.1 G/DL (ref 31.5–35.7)
MCV RBC AUTO: 80.8 FL (ref 79–97)
MONOCYTES # BLD AUTO: 0.73 10*3/MM3 (ref 0.1–0.9)
MONOCYTES NFR BLD AUTO: 9.7 % (ref 5–12)
NEUTROPHILS NFR BLD AUTO: 3.19 10*3/MM3 (ref 1.7–7)
NEUTROPHILS NFR BLD AUTO: 42.6 % (ref 42.7–76)
PLATELET # BLD AUTO: 343 10*3/MM3 (ref 140–450)
PMV BLD AUTO: 9.1 FL (ref 6–12)
POTASSIUM SERPL-SCNC: 4.3 MMOL/L (ref 3.5–5.2)
PROT SERPL-MCNC: 6.6 G/DL (ref 6–8.5)
RBC # BLD AUTO: 4.74 10*6/MM3 (ref 3.77–5.28)
SODIUM SERPL-SCNC: 139 MMOL/L (ref 136–145)
TRIGL SERPL-MCNC: 187 MG/DL (ref 0–150)
TSH SERPL DL<=0.05 MIU/L-ACNC: 1.55 UIU/ML (ref 0.27–4.2)
VLDLC SERPL-MCNC: 31 MG/DL (ref 5–40)
WBC NRBC COR # BLD AUTO: 7.5 10*3/MM3 (ref 3.4–10.8)

## 2024-10-08 PROCEDURE — 80050 GENERAL HEALTH PANEL: CPT

## 2024-10-08 PROCEDURE — 36415 COLL VENOUS BLD VENIPUNCTURE: CPT

## 2024-10-08 PROCEDURE — 80061 LIPID PANEL: CPT

## 2024-12-05 DIAGNOSIS — Z79.890 HORMONE REPLACEMENT THERAPY (POSTMENOPAUSAL): ICD-10-CM

## 2024-12-05 DIAGNOSIS — I10 ESSENTIAL (PRIMARY) HYPERTENSION: ICD-10-CM

## 2024-12-06 RX ORDER — LISINOPRIL AND HYDROCHLOROTHIAZIDE 12.5; 2 MG/1; MG/1
1 TABLET ORAL DAILY
Qty: 90 TABLET | Refills: 0 | Status: SHIPPED | OUTPATIENT
Start: 2024-12-06

## 2024-12-06 RX ORDER — ESTRADIOL 0.5 MG/1
0.5 TABLET ORAL
Qty: 45 TABLET | Refills: 0 | Status: SHIPPED | OUTPATIENT
Start: 2024-12-06

## 2024-12-09 ENCOUNTER — HOSPITAL ENCOUNTER (OUTPATIENT)
Dept: MAMMOGRAPHY | Facility: HOSPITAL | Age: 59
Discharge: HOME | End: 2024-12-09
Admitting: NURSE PRACTITIONER
Payer: COMMERCIAL

## 2024-12-09 DIAGNOSIS — Z00.00 ROUTINE GENERAL MEDICAL EXAMINATION AT A HEALTH CARE FACILITY: ICD-10-CM

## 2024-12-09 PROCEDURE — 77063 BREAST TOMOSYNTHESIS BI: CPT

## 2024-12-09 PROCEDURE — 77067 SCR MAMMO BI INCL CAD: CPT

## 2025-01-09 ENCOUNTER — HOSPITAL ENCOUNTER (EMERGENCY)
Facility: HOSPITAL | Age: 60
Discharge: HOME | End: 2025-01-09
Attending: EMERGENCY MEDICINE
Payer: COMMERCIAL

## 2025-01-09 VITALS
WEIGHT: 132 LBS | DIASTOLIC BLOOD PRESSURE: 95 MMHG | TEMPERATURE: 96.5 F | SYSTOLIC BLOOD PRESSURE: 159 MMHG | HEART RATE: 102 BPM | BODY MASS INDEX: 22.53 KG/M2 | RESPIRATION RATE: 16 BRPM | OXYGEN SATURATION: 100 % | HEIGHT: 64 IN

## 2025-01-09 DIAGNOSIS — S29.012A STRAIN OF RIGHT RHOMBOID MUSCLE: Primary | ICD-10-CM

## 2025-01-09 PROCEDURE — 25010000002 KETOROLAC TROMETHAMINE PER 15 MG: Performed by: EMERGENCY MEDICINE

## 2025-01-09 PROCEDURE — 96372 THER/PROPH/DIAG INJ SC/IM: CPT

## 2025-01-09 PROCEDURE — 99283 EMERGENCY DEPT VISIT LOW MDM: CPT

## 2025-01-09 RX ORDER — HYDROCODONE BITARTRATE AND ACETAMINOPHEN 5; 325 MG/1; MG/1
1 TABLET ORAL ONCE
Status: COMPLETED | OUTPATIENT
Start: 2025-01-09 | End: 2025-01-09

## 2025-01-09 RX ORDER — LIDOCAINE 4 G/G
1 PATCH TOPICAL ONCE
Status: DISCONTINUED | OUTPATIENT
Start: 2025-01-09 | End: 2025-01-10 | Stop reason: HOSPADM

## 2025-01-09 RX ORDER — HYDROCODONE BITARTRATE AND ACETAMINOPHEN 5; 325 MG/1; MG/1
1 TABLET ORAL EVERY 6 HOURS PRN
Status: DISCONTINUED | OUTPATIENT
Start: 2025-01-09 | End: 2025-01-09

## 2025-01-09 RX ORDER — DIAZEPAM 5 MG/1
5 TABLET ORAL ONCE
Status: COMPLETED | OUTPATIENT
Start: 2025-01-09 | End: 2025-01-09

## 2025-01-09 RX ORDER — TIZANIDINE 2 MG/1
2 TABLET ORAL NIGHTLY PRN
Qty: 20 TABLET | Refills: 0 | Status: SHIPPED | OUTPATIENT
Start: 2025-01-09

## 2025-01-09 RX ORDER — METHYLPREDNISOLONE 4 MG/1
TABLET ORAL
Qty: 21 TABLET | Refills: 0 | Status: SHIPPED | OUTPATIENT
Start: 2025-01-09

## 2025-01-09 RX ORDER — KETOROLAC TROMETHAMINE 30 MG/ML
60 INJECTION, SOLUTION INTRAMUSCULAR; INTRAVENOUS ONCE
Status: COMPLETED | OUTPATIENT
Start: 2025-01-09 | End: 2025-01-09

## 2025-01-09 RX ADMIN — LIDOCAINE 1 PATCH: 4 PATCH TOPICAL at 21:20

## 2025-01-09 RX ADMIN — DIAZEPAM 5 MG: 5 TABLET ORAL at 21:20

## 2025-01-09 RX ADMIN — HYDROCODONE BITARTRATE AND ACETAMINOPHEN 1 TABLET: 5; 325 TABLET ORAL at 21:28

## 2025-01-09 RX ADMIN — KETOROLAC TROMETHAMINE 60 MG: 30 INJECTION, SOLUTION INTRAMUSCULAR at 21:19

## 2025-01-10 NOTE — ED PROVIDER NOTES
EMERGENCY DEPARTMENT ENCOUNTER  Room Number:  40/40  PCP: Flaquita Castro APRN  Independent Historians: Patient      HPI:  Chief Complaint: had concerns including Back Pain.       A complete HPI/ROS/PMH/PSH/SH/FH are unobtainable due to: None    Chronic or social conditions impacting patient care (Social Determinants of Health): None      Context: Karen Vigil is a 59 y.o. female with a medical history of hypertension and migraines presents emergency department today with pain to her right upper back x 5 days.  She denies any falls, trauma, or injuries.  She says the pain is worse with movement, particularly rotating to the right or raising her arm over her head.  She went to urgent care yesterday was prescribed meloxicam and Robaxin with no relief.  She has also tried Tylenol, ibuprofen, and Flexeril at home.  She denies numbness, tingling, difficulty moving the arms.  She denies pain in her neck or low back.  She denies chest pain or shortness of breath.  She says at times it does hurt when she takes a deep breath.  She denies abdominal pain, nausea, vomiting.      Review of prior external notes (non-ED) -and- Review of prior external test results outside of this encounter:   Patient was seen at urgent care yesterday and had mid back pain on the right for 4 days.  They reported it was pleuritic and aggravated by using the right arm over the head.  Chest x-ray was normal.  They diagnosed her with a strain of the rhomboid muscle and prescribed her meloxicam and Robaxin.        PAST MEDICAL HISTORY  Active Ambulatory Problems     Diagnosis Date Noted    Essential (primary) hypertension     Hormone replacement therapy (postmenopausal)     Migraine with aura, intractable, without status migrainosus     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, face and neck     Encounter for screening mammogram for malignant neoplasm of breast 09/15/2022    Screening for viral disease 11/14/2023     Lipoma of back 03/05/2024    Routine general medical examination at a health care facility 09/10/2024     Resolved Ambulatory Problems     Diagnosis Date Noted    Acute maxillary sinusitis 09/15/2022     Past Medical History:   Diagnosis Date    Abnormal results of kidney function studies     Allergic rhinitis     Radiculopathy, lumbar region     Wears hearing aid in both ears          PAST SURGICAL HISTORY  Past Surgical History:   Procedure Laterality Date    COLONOSCOPY      EXCISION MASS TRUNK N/A 8/5/2024    Procedure: LIPOMA EXCISION;  Surgeon: Akil Johnson MD;  Location: [facility] OR;  Service: General;  Laterality: N/A;    HYSTERECTOMY      TAHBSO    TYMPANOPLASTY      LEFT EAR WITH HEARING LOSS         FAMILY HISTORY  Family History   Problem Relation Age of Onset    Hypertension Mother     Hyperthyroidism Mother     Heart attack Father     Hypertension Maternal Grandmother     Malig Hyperthermia Neg Hx          SOCIAL HISTORY  Social History     Socioeconomic History    Marital status:     Number of children: 2   Tobacco Use    Smoking status: Never    Smokeless tobacco: Never   Vaping Use    Vaping status: Never Used   Substance and Sexual Activity    Alcohol use: Never    Drug use: Never    Sexual activity: Yes     Partners: Male     Birth control/protection: None, Hysterectomy         ALLERGIES  Conjugated estrogens      REVIEW OF SYSTEMS  Included in HPI  All systems reviewed and negative except for those discussed in HPI.      PHYSICAL EXAM    I have reviewed the triage vital signs and nursing notes.    ED Triage Vitals [01/09/25 2052]   Temp Heart Rate Resp BP SpO2   96.5 °F (35.8 °C) 102 16 -- 100 %      Temp src Heart Rate Source Patient Position BP Location FiO2 (%)   Tympanic -- -- -- --       Physical Exam  Constitutional:       General: She is not in acute distress.     Appearance: Normal appearance.   HENT:      Head: Normocephalic and atraumatic.      Nose: Nose normal.       Mouth/Throat:      Mouth: Mucous membranes are moist.   Eyes:      Extraocular Movements: Extraocular movements intact.      Pupils: Pupils are equal, round, and reactive to light.   Cardiovascular:      Rate and Rhythm: Normal rate and regular rhythm.      Pulses: Normal pulses.      Heart sounds: Normal heart sounds.   Pulmonary:      Effort: Pulmonary effort is normal. No respiratory distress.      Breath sounds: Normal breath sounds.   Abdominal:      General: Abdomen is flat. There is no distension.      Palpations: Abdomen is soft.      Tenderness: There is no abdominal tenderness.   Musculoskeletal:         General: Normal range of motion.      Cervical back: Normal range of motion and neck supple.      Comments: no obvious swelling, deformity, or skin abnormality to the back.  Tender to palpation in the right subscapular region.  Full range of motion intact although she does have pain in the right subscapular region with range of motion of the right shoulder, . Positive thumbs up/okay sign/fingers abduct/fingers abduct, sensation intact light touch radial/medial/ulnar nerve distribution, 2+ radial and ulnar pulses, brisk cap refill all digits.     Skin:     General: Skin is warm and dry.      Capillary Refill: Capillary refill takes less than 2 seconds.   Neurological:      General: No focal deficit present.      Mental Status: She is alert and oriented to person, place, and time.   Psychiatric:         Mood and Affect: Mood normal.         Behavior: Behavior normal.         LAB RESULTS  No results found for this or any previous visit (from the past 24 hours).      RADIOLOGY  No Radiology Exams Resulted Within Past 24 Hours      MEDICATIONS GIVEN IN ER  Medications   Lidocaine 4 % 1 patch (1 patch Transdermal Medication Applied 1/9/25 2120)   diazePAM (VALIUM) tablet 5 mg (5 mg Oral Given 1/9/25 2120)   ketorolac (TORADOL) injection 60 mg (60 mg Intramuscular Given 1/9/25 2119)   HYDROcodone-acetaminophen  (NORCO) 5-325 MG per tablet 1 tablet (1 tablet Oral Given 1/9/25 2128)         OUTPATIENT MEDICATION MANAGEMENT:  Current Facility-Administered Medications Ordered in Epic   Medication Dose Route Frequency Provider Last Rate Last Admin    Lidocaine 4 % 1 patch  1 patch Transdermal Once Kelly Rebollar MD   1 patch at 01/09/25 2120     Current Outpatient Medications Ordered in Epic   Medication Sig Dispense Refill    amitriptyline (ELAVIL) 25 MG tablet Take 1 tablet by mouth At Night As Needed (headaches). 90 tablet 0    Diclofenac Sodium (VOLTAREN) 1 % gel gel Apply 4 g topically to the appropriate area as directed 4 (Four) Times a Day As Needed (back pain). 100 g 0    estradiol (ESTRACE) 0.5 MG tablet TAKE 1 TABLET BY MOUTH EVERY OTHER DAY 45 tablet 0    fexofenadine (ALLEGRA) 180 MG tablet Take 1 tablet by mouth Daily.      fluticasone (FLONASE) 50 MCG/ACT nasal spray 2 sprays into the nostril(s) as directed by provider Daily. (Patient not taking: Reported on 9/10/2024)      lisinopril-hydrochlorothiazide (PRINZIDE,ZESTORETIC) 20-12.5 MG per tablet Take 1 tablet by mouth once daily 90 tablet 0    meloxicam (MOBIC) 15 MG tablet Take 1 tablet by mouth Daily. 20 tablet 0    methylPREDNISolone (MEDROL) 4 MG dose pack Take as directed on package instructions. 21 tablet 0    Omega-3 Fatty Acids (Fish Oil) 1200 MG capsule delayed-release capsule Take 1 capsule by mouth Daily. HOLD FOR SURGERY      tiZANidine (ZANAFLEX) 2 MG tablet Take 1 tablet by mouth At Night As Needed for Muscle Spasms. 20 tablet 0           PROGRESS, DATA ANALYSIS, CONSULTS, AND MEDICAL DECISION MAKING  ORDERS PLACED DURING THIS VISIT:  No orders of the defined types were placed in this encounter.      All labs have been independently interpreted by me.  All radiology studies have been reviewed by me. All EKG's have been independently viewed and interpreted by me.  Discussion below represents my analysis of pertinent findings related to patient's  condition, differential diagnosis, treatment plan and final disposition.    Differential diagnosis includes but is not limited to:   My differential diagnosis for back pain includes but is not limited to:  Musculoskeletal strain, contusion, retroperitoneal hematoma, disc protrusion, vertebral fracture, transverse process fracture, rib fracture, facet syndrome, sacroiliac joint strain, sciatica, renal injury, splenic injury, pancreatic injury, osteoarthritis, lumbar spondylosis, spinal stenosis, ankylosing spondylitis, sacroiliac joint inflammation, pancreatitis, perforated peptic ulcer, diverticulitis, endometriosis, chronic PID, epidural abscess, osteomyelitis, retroperitoneal abscess, pyelonephritis, pneumonia, subphrenic abscess, tuberculosis, neurofibroma, meningioma, multiple myeloma, lymphoma, metastatic cancer, primary cancer, AAA, aortic dissection, spinal ischemia, referred pain, ureterolithiasis      ED Course:  ED Course as of 01/09/25 2217   Thu Jan 09, 2025 2104 I discussed the case with Dr. Rebollar and she agrees to evaluate the patient at the bedside.    [CC]   2216 Patient is a 59-year-old female presents emergency department today with what appears to be a muscle strain to her right rhomboid muscle.  On arrival here in the emergency department vitals are reassuring, she is afebrile.  My exam the patient has reproducible tenderness palpation along the right rhomboid area in the subscapular region.  Patient was given pain medication, muscle relaxers, lidocaine patches with improvement of pain.  Suspect a muscle spasm, labs and imaging not indicated.  Encouraged her to use heating pad, massage, follow-up with primary care for referral for physical therapy.  Return precautions were given.  Will treat with muscle relaxer, steroids, and topical Voltaren at home.  She will be discharged with outpatient follow-up. [CC]      ED Course User Index  [CC] Diya Rosario PA-C           AS OF 22:17 EST  VITALS:    BP - 159/95  HR - 102  TEMP - 96.5 °F (35.8 °C) (Tympanic)  O2 SATS - 100%        DIAGNOSIS  Final diagnoses:   Strain of right rhomboid muscle         DISPOSITION  ED Disposition       ED Disposition   Discharge    Condition   Stable    Comment   --                      Please note that portions of this document were completed with a voice recognition program.    Note Disclaimer: At [facility], we believe that sharing information builds trust and better relationships. You are receiving this note because you recently visited [facility]. It is possible you will see health information before a provider has talked with you about it. This kind of information can be easy to misunderstand. To help you fully understand what it means for your health, we urge you to discuss this note with your provider.     Diya Rosario PA-C  01/09/25 3443

## 2025-01-10 NOTE — ED PROVIDER NOTES
I have personally performed a face-to-face diagnostic evaluation of the patient.  I have reviewed and agree with the care plan as outlined by NP/PA.  My findings are as follows:    HPI:  Patient is a 59 y.o. female who presents with complaints of right upper back pain.  Symptoms seem to be around the shoulder blade and up into the neck.  It is worse with movement of the neck and chest wall.  She denies any numbness or weakness in the extremities.  No bladder or bowel dysfunction, no chest pain.      PE:  Physical Exam  Constitutional:       Appearance: Normal appearance.   HENT:      Head: Normocephalic and atraumatic.   Eyes:      Conjunctiva/sclera: Conjunctivae normal.   Pulmonary:      Effort: Pulmonary effort is normal. No respiratory distress.   Musculoskeletal:      Comments: Cervical, Thoracic, Lumbar spine: No step-offs or deformities, no midline tenderness to palpation.  There is tenderness to palpation of the levator scapula, rhomboid, upper trapezius on the right  Groin: No saddle anesthesia  Bilateral upper extremities: 5 out of 5 strength, sensation intact to light touch  Bilateral lower extremities:  Negative straight leg raise.  5 out 5 strength.  Sensation intact to light touch.  2+ reflexes. No clonus.  warm and well perfused     Neurological:      Mental Status: She is alert and oriented to person, place, and time.           MDM:  I provided a substantiate portion of the care of this patient. I personally performed the medical decision making.    Medical records are reviewed in [facility] and Care Everywhere, if applicable    Patient is well-appearing.    -Normal gait.    - Normal reflexes .    - Normal strength  - Sensation is intact  - No urinary incontinence or issues.  No bowel issues or incontinence.    - No fevers or chills.    - No point vertebral tenderness.  No redness or warmth of the spine.  - No significant trauma.      Work up not consistent with discitis, osteomyelitis, cauda equina  Subjective:       Patient ID: El Sexton is a 55 y.o. male.    Chief Complaint: Mass (rib cage x4 months )     The patient is a 55-year-old male that presents with concerns of a lump on his ribcage she noticed 4 months ago, no other associated complaints.  He underwent gastric bypass in August of 2019 and has lost 135 lb.  His BMI is essentially within normal limits.  He denies joint pain or swelling, actually reports significant decline in generalized pain since his weight loss.    No past medical history on file.    Past Surgical History:   Procedure Laterality Date    ANTERIOR CERVICAL CORPECTOMY W/ FUSION  09/1985    Monty Bower MD    ANTERIOR CERVICAL CORPECTOMY W/ FUSION  12/1985    revision d/t work accident...Monty Bower MD    knee syrgery Right 10/2006    knee replacement    LUMBAR FUSION  10/2010    Monty Bower MD    stomach sleeve  08/2019    [facility]        Social History     Socioeconomic History    Marital status:      Spouse name: Yeny     Number of children: 1    Years of education: Not on file    Highest education level: Bachelor's degree (e.g., BA, AB, BS)   Occupational History    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    Social Needs    Financial resource strain: Not on file    Food insecurity:     Worry: Not on file     Inability: Not on file    Transportation needs:     Medical: Not on file     Non-medical: Not on file   Tobacco Use    Smoking status: Never Smoker    Smokeless tobacco: Never Used   Substance and Sexual Activity    Alcohol use: Not Currently     Frequency: Never    Drug use: Never    Sexual activity: Yes     Partners: Female   Lifestyle    Physical activity:     Days per week: Not on file     Minutes per session: Not on file    Stress: Not at all   Relationships    Social connections:     Talks on phone: Not on file     Gets together: Not on file     Attends Restoration service: Not on file     Active member of club or organization: Not  on file     Attends meetings of clubs or organizations: Not on file     Relationship status: Not on file   Other Topics Concern    Not on file   Social History Narrative    Not on file       Family History   Problem Relation Age of Onset    Brain cancer Mother 36        unknown type    Heart disease Father 68        CABG-Stents-Valve replacement- hx since childhood    Parkinsonism Sister     Leukemia Child         childhood. in remission since 10 y/o    Colon cancer Neg Hx     Breast cancer Neg Hx        Review of patient's allergies indicates:  No Known Allergies       Current Outpatient Medications:     calcium carbonate (OS-VICK) 500 mg calcium (1,250 mg) tablet, Take 1 tablet by mouth once daily., Disp: , Rfl:     multivitamin capsule, Take 1 capsule by mouth once daily., Disp: , Rfl:     vitamin D (VITAMIN D3) 1000 units Tab, Take 1,000 Units by mouth once daily., Disp: , Rfl:     HPI  Review of Systems   Constitutional: Negative.    HENT: Negative.    Eyes: Negative.    Respiratory: Negative.    Cardiovascular: Negative.    Gastrointestinal: Negative.    Endocrine: Negative.    Genitourinary: Negative.    Musculoskeletal: Negative.         Lump on rib   Skin: Negative.    Allergic/Immunologic: Negative.    Neurological: Negative.    Hematological: Negative.    Psychiatric/Behavioral: Negative.        Objective:      Physical Exam   Constitutional: He is oriented to person, place, and time. He appears well-developed and well-nourished.   HENT:   Head: Normocephalic and atraumatic.   Mouth/Throat: Oropharynx is clear and moist.   Eyes: Pupils are equal, round, and reactive to light. Conjunctivae are normal. No scleral icterus.   Neck: Normal range of motion. Neck supple. No thyromegaly present.   Cardiovascular: Normal rate, regular rhythm and normal heart sounds.   No murmur heard.  Pulmonary/Chest: Effort normal and breath sounds normal. No respiratory distress. He has no wheezes. He has no rales.  syndrome or acute fracture.    Will plan on supportive care at this time.  Discussed with patient that they need to followup with their doctor to discuss possible MRI.  Discussed need to return for increased pain, fevers, chills, numbness, tingling, weakness, urinary or bowel incontinence.      Impression:  Final diagnoses:   Strain of right rhomboid muscle         Disposition:  ED Disposition       ED Disposition   Discharge    Condition   Stable    Comment   --                Kelly Rebollar MD  01/09/25 3354       Abdominal: Soft. Bowel sounds are normal. He exhibits no distension. There is no tenderness.   Musculoskeletal: Normal range of motion. He exhibits no edema.   More distinct xiphoid process than most, yet normal.    Neurological: He is alert and oriented to person, place, and time. No sensory deficit. He exhibits normal muscle tone.   Skin: Skin is warm and dry. Capillary refill takes less than 2 seconds. No rash noted.   Psychiatric: He has a normal mood and affect. His behavior is normal. Judgment and thought content normal.   Nursing note and vitals reviewed.      Assessment:       1. Rib pain        Plan:       1- cxr to r/o chest wall abnormalities yet most likely is natural anatomy noticed since weight loss.   2- RTC prn    Rib pain  -     X-Ray Chest PA And Lateral; Future; Expected date: 06/09/2020        Risks, benefits, and side effects were discussed with the patient. All questions were answered to the fullest satisfaction of the patient, and pt verbalized understanding and agreement to treatment plan. Pt was to call with any new or worsening symptoms, or present to the ER.

## 2025-01-13 ENCOUNTER — OFFICE VISIT (OUTPATIENT)
Dept: FAMILY MEDICINE CLINIC | Age: 60
End: 2025-01-13
Payer: COMMERCIAL

## 2025-01-13 ENCOUNTER — HOSPITAL ENCOUNTER (OUTPATIENT)
Dept: GENERAL RADIOLOGY | Facility: HOSPITAL | Age: 60
Discharge: HOME | End: 2025-01-13
Admitting: NURSE PRACTITIONER
Payer: COMMERCIAL

## 2025-01-13 VITALS
DIASTOLIC BLOOD PRESSURE: 96 MMHG | OXYGEN SATURATION: 98 % | HEIGHT: 64 IN | SYSTOLIC BLOOD PRESSURE: 166 MMHG | WEIGHT: 136.8 LBS | HEART RATE: 67 BPM | BODY MASS INDEX: 23.35 KG/M2

## 2025-01-13 DIAGNOSIS — M54.6 ACUTE BILATERAL THORACIC BACK PAIN: Primary | ICD-10-CM

## 2025-01-13 DIAGNOSIS — M54.6 ACUTE BILATERAL THORACIC BACK PAIN: ICD-10-CM

## 2025-01-13 PROCEDURE — 72072 X-RAY EXAM THORAC SPINE 3VWS: CPT

## 2025-01-13 PROCEDURE — 99213 OFFICE O/P EST LOW 20 MIN: CPT | Performed by: NURSE PRACTITIONER

## 2025-01-13 RX ORDER — LIDOCAINE 50 MG/G
1 PATCH TOPICAL EVERY 24 HOURS
Qty: 30 EACH | Refills: 0 | Status: SHIPPED | OUTPATIENT
Start: 2025-01-13

## 2025-01-13 NOTE — PROGRESS NOTES
"Chief Complaint  Back Pain (Follow up from visit with UC on 1/8 and ER visit on 1/9.//Pt states that the steroid she was given makes her nauseous but that she has 2 days left./)    Subjective          Karen Vigil presents to [facility]  History of Present Illness  She went to  and prescribed meloxicam. She [facility].  She had presented with complaints of right upper back pain that seem to be stemming around her shoulder blade and going up to her neck.  They did not perform an x-ray. She reports that the lidocaine patch helped the best.   Back Pain  This is a new problem. The current episode started in the past 7 days. The problem occurs constantly. The problem has been worsening since onset. The pain is present in the thoracic spine. The quality of the pain is described as stabbing. The pain does not radiate. The pain is at a severity of 8/10. The pain is Worse during the night. The symptoms are aggravated by twisting. Stiffness is present In the morning. Pertinent negatives include no abdominal pain, bladder incontinence, bowel incontinence, chest pain, dysuria, fever, leg pain, numbness, paresthesias, pelvic pain, perianal numbness, tingling, weakness or weight loss. She has tried NSAIDs (Medrol dose pack, lidocaine patch) for the symptoms. The treatment provided mild relief.   Additional comments: If I turn my head I feel the pain in my back. It feels as though I am pulling something and it hurts.      Objective   Vital Signs:   /96 (BP Location: Left arm, Patient Position: Sitting)   Pulse 67   Ht 162.6 cm (64\")   Wt 62.1 kg (136 lb 12.8 oz)   SpO2 98% Comment: room air  BMI 23.48 kg/m²     Physical Exam  Constitutional:       General: She is not in acute distress.     Appearance: Normal appearance. She is normal weight.   HENT:      Head: Normocephalic.   Eyes:      Pupils: Pupils are equal, round, and reactive to " light.      Visual Fields: Right eye visual fields normal and left eye visual fields normal.   Neck:      Trachea: Trachea normal.   Cardiovascular:      Rate and Rhythm: Normal rate and regular rhythm.      Heart sounds: Normal heart sounds.   Pulmonary:      Effort: Pulmonary effort is normal.      Breath sounds: Normal breath sounds and air entry.   Musculoskeletal:      Thoracic back: No swelling, spasms or tenderness.      Right lower leg: No edema.      Left lower leg: No edema.   Skin:     General: Skin is warm and dry.   Neurological:      Mental Status: She is alert and oriented to person, place, and time.   Psychiatric:         Mood and Affect: Mood and affect normal.         Behavior: Behavior normal.         Thought Content: Thought content normal.        Result Review :   The following data was reviewed by: ADI Hernandez on 01/13/2025:                  Assessment and Plan    Diagnoses and all orders for this visit:    1. Acute bilateral thoracic back pain (Primary)  -     XR Spine Thoracic 3 View; Future  -     lidocaine (LIDODERM) 5 %; Place 1 patch on the skin as directed by provider Daily. Remove & Discard patch within 12 hours or as directed by MD  Dispense: 30 each; Refill: 0    The lidocaine patches helped, so we will send in a new prescription.  Will obtain an x-ray of her thoracic spine.  She would like to defer PT at this time.    BMI is within normal parameters. No other follow-up for BMI required.       Follow Up   Return if symptoms worsen or fail to improve.  Patient was given instructions and counseling regarding her condition or for health maintenance advice. Please see specific information pulled into the AVS if appropriate.

## 2025-01-17 ENCOUNTER — TELEPHONE (OUTPATIENT)
Dept: FAMILY MEDICINE CLINIC | Age: 60
End: 2025-01-17
Payer: COMMERCIAL

## 2025-01-17 DIAGNOSIS — M54.6 ACUTE BILATERAL THORACIC BACK PAIN: Primary | ICD-10-CM

## 2025-01-17 RX ORDER — MELOXICAM 7.5 MG/1
7.5 TABLET ORAL DAILY
Qty: 14 TABLET | Refills: 0 | Status: SHIPPED | OUTPATIENT
Start: 2025-01-17

## 2025-01-17 NOTE — TELEPHONE ENCOUNTER
Please let her know that I placed the referral to [facility].  I have given her a 2-week supply of meloxicam.  I would like for her to follow-up with [facility] in 2 weeks.  She is to avoid NSAID use while taking the medication.

## 2025-01-17 NOTE — TELEPHONE ENCOUNTER
Caller: Musa Karen Y    Relationship: Self    Best call back number: [phone]     What medication are you requesting: PAIN MEDICATION     What are your current symptoms: NECK AND BACK PAIN (PROVIDER AWARE)    Have you had these symptoms before:    [x] Yes  [] No    Have you been treated for these symptoms before:   [x] Yes  [] No    If a prescription is needed, what is your preferred pharmacy and phone number: [facility] [address] [place], [place] - [address] [facility] [phone] [facility] [phone] FX     Additional notes: PATIENT DID COMPLETE STEROIDS BUT PAIN IS STILL THERE. CAN PROVIDER PLEASE SEND IN SOMETHING TODAY TO HELP WITH HER PAIN.        CONTACT PATIENT TO ADVISE.

## 2025-01-17 NOTE — TELEPHONE ENCOUNTER
Caller: Karen Vigil    Relationship: Self    Best call back number: [phone]     What is the medical concern/diagnosis: BACK AND NECK PAIN (PROVIDER AWARE)    What specialty or service is being requested: PHYSICAL THERAPY    What is the provider, practice or medical service name: [facility]    What is the office location: [facility] additional details: PATIENT WOULD LIKE TO PROCEED WITH PHYSICAL THERAPY. PLEASE PLACE REFERRAL AND CONTACT PATIENT TO SCHEDULE.      ASAP

## 2025-02-19 DIAGNOSIS — M54.6 ACUTE BILATERAL THORACIC BACK PAIN: ICD-10-CM

## 2025-02-19 RX ORDER — LIDOCAINE 50 MG/G
1 PATCH TOPICAL EVERY 24 HOURS
Qty: 30 EACH | Refills: 0 | Status: SHIPPED | OUTPATIENT
Start: 2025-02-19

## 2025-02-19 NOTE — TELEPHONE ENCOUNTER
Rx Refill Note  Requested Prescriptions     Pending Prescriptions Disp Refills    lidocaine (LIDODERM) 5 % 30 each 0     Sig: Place 1 patch on the skin as directed by provider Daily. Remove & Discard patch within 12 hours or as directed by MD      Last office visit with prescribing clinician: 9/10/2024   Last telemedicine visit with prescribing clinician: Visit date not found   Next office visit with prescribing clinician: 3/12/2025, PEGGY Castro  Sent to pharmacy by Faith villela 01/13/25, #30 for back pain.  She is no longer in our practice.  Sent to pcp A Matthew Rivera LPN  02/19/25, 14:56 EST

## 2025-03-12 ENCOUNTER — OFFICE VISIT (OUTPATIENT)
Dept: FAMILY MEDICINE CLINIC | Age: 60
End: 2025-03-12
Payer: COMMERCIAL

## 2025-03-12 ENCOUNTER — LAB (OUTPATIENT)
Dept: LAB | Facility: HOSPITAL | Age: 60
End: 2025-03-12
Payer: COMMERCIAL

## 2025-03-12 VITALS
HEIGHT: 64 IN | DIASTOLIC BLOOD PRESSURE: 85 MMHG | HEART RATE: 85 BPM | OXYGEN SATURATION: 99 % | TEMPERATURE: 98 F | BODY MASS INDEX: 23.05 KG/M2 | SYSTOLIC BLOOD PRESSURE: 135 MMHG | WEIGHT: 135 LBS

## 2025-03-12 DIAGNOSIS — I10 ESSENTIAL (PRIMARY) HYPERTENSION: ICD-10-CM

## 2025-03-12 DIAGNOSIS — G43.119 MIGRAINE WITH AURA, INTRACTABLE, WITHOUT STATUS MIGRAINOSUS: ICD-10-CM

## 2025-03-12 DIAGNOSIS — I10 ESSENTIAL (PRIMARY) HYPERTENSION: Primary | ICD-10-CM

## 2025-03-12 DIAGNOSIS — Z79.890 HORMONE REPLACEMENT THERAPY (POSTMENOPAUSAL): ICD-10-CM

## 2025-03-12 PROBLEM — R63.0 ANOREXIA: Status: RESOLVED | Noted: 2025-03-12 | Resolved: 2025-03-12

## 2025-03-12 PROBLEM — R63.0 ANOREXIA: Status: ACTIVE | Noted: 2025-03-12

## 2025-03-12 LAB
ANION GAP SERPL CALCULATED.3IONS-SCNC: 10.5 MMOL/L (ref 5–15)
BUN SERPL-MCNC: 14 MG/DL (ref 6–20)
BUN/CREAT SERPL: 14.6 (ref 7–25)
CALCIUM SPEC-SCNC: 9.5 MG/DL (ref 8.6–10.5)
CHLORIDE SERPL-SCNC: 98 MMOL/L (ref 98–107)
CO2 SERPL-SCNC: 29.5 MMOL/L (ref 22–29)
CREAT SERPL-MCNC: 0.96 MG/DL (ref 0.57–1)
EGFRCR SERPLBLD CKD-EPI 2021: 68.7 ML/MIN/1.73
GLUCOSE SERPL-MCNC: 104 MG/DL (ref 65–99)
POTASSIUM SERPL-SCNC: 3.8 MMOL/L (ref 3.5–5.2)
SODIUM SERPL-SCNC: 138 MMOL/L (ref 136–145)

## 2025-03-12 PROCEDURE — 36415 COLL VENOUS BLD VENIPUNCTURE: CPT

## 2025-03-12 PROCEDURE — 99213 OFFICE O/P EST LOW 20 MIN: CPT | Performed by: NURSE PRACTITIONER

## 2025-03-12 PROCEDURE — 80048 BASIC METABOLIC PNL TOTAL CA: CPT

## 2025-03-12 RX ORDER — ESTRADIOL 0.5 MG/1
0.5 TABLET ORAL
Qty: 45 TABLET | Refills: 0 | Status: SHIPPED | OUTPATIENT
Start: 2025-03-12

## 2025-03-12 RX ORDER — LISINOPRIL AND HYDROCHLOROTHIAZIDE 12.5; 2 MG/1; MG/1
1 TABLET ORAL DAILY
Qty: 90 TABLET | Refills: 0 | Status: SHIPPED | OUTPATIENT
Start: 2025-03-12

## 2025-03-12 NOTE — PROGRESS NOTES
Chief Complaint  Hypertension (6 mo. 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, face and neck     Essential (primary) hypertension     Hormone replacement therapy (postmenopausal)     Lipoma of back     Migraine with aura, intractable, without status migrainosus     Radiculopathy, lumbar region     Wears hearing aid in both ears        Allergies   Allergen Reactions    Conjugated Estrogens Unknown - Low Severity     PREMARIN        Past Surgical History:   Procedure Laterality Date    COLONOSCOPY      EXCISION MASS TRUNK N/A 8/5/2024    Procedure: LIPOMA EXCISION;  Surgeon: Akil Johnson MD;  Location: [facility] OR;  Service: General;  Laterality: N/A;    HYSTERECTOMY      TAHBSO    TYMPANOPLASTY      LEFT EAR WITH HEARING LOSS        Social History     Tobacco Use    Smoking status: Never    Smokeless tobacco: Never   Substance Use Topics    Alcohol use: Never       Family History   Problem Relation Age of Onset    Hypertension Mother     Hyperthyroidism Mother     Heart attack Father     Hypertension Maternal Grandmother     Malig Hyperthermia Neg Hx         Health Maintenance Due   Topic Date Due    Pneumococcal Vaccine 50+ (1 of 1 - PCV) Never done    ZOSTER VACCINE (1 of 2) Never done        Current Outpatient Medications on File Prior to Visit   Medication Sig    fexofenadine (ALLEGRA) 180 MG tablet Take 1 tablet by mouth Daily.    [DISCONTINUED] amitriptyline (ELAVIL) 25 MG tablet Take 1 tablet by mouth At Night As Needed (headaches).    [DISCONTINUED] estradiol (ESTRACE) 0.5 MG tablet TAKE 1 TABLET BY MOUTH EVERY OTHER DAY    [DISCONTINUED] lisinopril-hydrochlorothiazide (PRINZIDE,ZESTORETIC) 20-12.5 MG per tablet Take 1 tablet by mouth  "once daily    [DISCONTINUED] Diclofenac Sodium (VOLTAREN) 1 % gel gel Apply 4 g topically to the appropriate area as directed 4 (Four) Times a Day As Needed (back pain). (Patient not taking: Reported on 3/12/2025)    [DISCONTINUED] lidocaine (LIDODERM) 5 % Place 1 patch on the skin as directed by provider Daily. Remove & Discard patch within 12 hours or as directed by MD (Patient not taking: Reported on 3/12/2025)    [DISCONTINUED] meloxicam (Mobic) 7.5 MG tablet Take 1 tablet by mouth Daily. (Patient not taking: Reported on 3/12/2025)    [DISCONTINUED] methylPREDNISolone (MEDROL) 4 MG dose pack Take as directed on package instructions. (Patient not taking: Reported on 3/12/2025)    [DISCONTINUED] tiZANidine (ZANAFLEX) 2 MG tablet Take 1 tablet by mouth At Night As Needed for Muscle Spasms. (Patient not taking: Reported on 3/12/2025)     No current facility-administered medications on file prior to visit.       Immunization History   Administered Date(s) Administered    COVID-19 (MODERNA) 12YRS+ (SPIKEVAX) 11/01/2024    COVID-19 (PFIZER) BIVALENT 12+YRS 11/05/2022    COVID-19 (PFIZER) Purple Cap Monovalent 04/12/2021, 05/03/2021    Flublock Quad =>18yrs 11/01/2024    Fluzone (or Fluarix & Flulaval for VFC) >6mos 10/20/2021    Influenza recombinant 11/01/2024    Influenza, Unspecified 10/09/2023    Tdap 11/27/2019       Review of Systems   Constitutional:  Negative for fatigue and fever.   Respiratory:  Negative for cough and shortness of breath.    Cardiovascular:  Negative for chest pain, palpitations and leg swelling.   Musculoskeletal:  Positive for back pain (occ back pain, doing some therapy).        Objective     Vitals:    03/12/25 1610   BP: 135/85   BP Location: Right arm   Patient Position: Sitting   Cuff Size: Adult   Pulse: 85   Temp: 98 °F (36.7 °C)   TempSrc: Oral   SpO2: 99%   Weight: 61.2 kg (135 lb)   Height: 162.6 cm (64\")            Physical Exam  Vitals reviewed.   Constitutional:       General: " She is not in acute distress.     Appearance: Normal appearance.   Neck:      Vascular: No carotid bruit.   Cardiovascular:      Rate and Rhythm: Normal rate and regular rhythm.      Heart sounds: Normal heart sounds. No murmur heard.  Pulmonary:      Effort: Pulmonary effort is normal. No respiratory distress.      Breath sounds: Normal breath sounds.   Musculoskeletal:      Right lower leg: No edema.      Left lower leg: No edema.   Neurological:      General: No focal deficit present.      Mental Status: She is alert.   Psychiatric:         Mood and Affect: Mood normal.         Behavior: Behavior normal.         Result Review :     The following data was reviewed by: ADI De La O on 03/12/2025:                       Assessment and Plan      Diagnoses and all orders for this visit:    1. Essential (primary) hypertension (Primary)  Assessment & Plan:  Hypertension is stable. advised to monitor BP at home. Continue current med. Continue to modify diet and lifestyle. Reviewed previous labs, will recheck her BMP today       Orders:  -     lisinopril-hydrochlorothiazide (PRINZIDE,ZESTORETIC) 20-12.5 MG per tablet; Take 1 tablet by mouth Daily.  Dispense: 90 tablet; Refill: 0  -     Basic metabolic panel; Future    2. Migraine with aura, intractable, without status migrainosus  Assessment & Plan:  Continue elavil.             Orders:  -     amitriptyline (ELAVIL) 25 MG tablet; Take 1 tablet by mouth At Night As Needed (headaches).  Dispense: 90 tablet; Refill: 0    3. Hormone replacement therapy (postmenopausal)  Assessment & Plan:  Reviewed her last mammogram, she would like to continue HRT, reviewed risk vs benefit     Orders:  -     estradiol (ESTRACE) 0.5 MG tablet; Take 1 tablet by mouth Every Other Day.  Dispense: 45 tablet; Refill: 0        BMI is within normal parameters. No other follow-up for BMI required.         Follow Up     Return for followup pending lab results.    Patient was given  instructions and counseling regarding her condition or for health maintenance advice. Please see specific information pulled into the AVS if appropriate.

## 2025-03-12 NOTE — ASSESSMENT & PLAN NOTE
Hypertension is stable. advised to monitor BP at home. Continue current med. Continue to modify diet and lifestyle. Reviewed previous labs, will recheck her BMP today

## 2025-05-23 DIAGNOSIS — Z79.890 HORMONE REPLACEMENT THERAPY (POSTMENOPAUSAL): ICD-10-CM

## 2025-05-23 RX ORDER — ESTRADIOL 0.5 MG/1
0.5 TABLET ORAL
Qty: 45 TABLET | Refills: 0 | Status: SHIPPED | OUTPATIENT
Start: 2025-05-23

## 2025-05-23 NOTE — TELEPHONE ENCOUNTER
Rx Refill Note  Requested Prescriptions     Pending Prescriptions Disp Refills    estradiol (ESTRACE) 0.5 MG tablet [Pharmacy Med Name: Estradiol 0.5 MG Oral Tablet] 45 tablet 0     Sig: TAKE 1 TABLET BY MOUTH EVERY OTHER DAY      Last office visit with prescribing clinician: 3/12/2025   Last telemedicine visit with prescribing clinician: Visit date not found   Next office visit with prescribing clinician: 9/18/2025    Estrogen and Estroge Combinations Protocol Failed      Mammo Screening Digital Tomosynthesis Bilateral With CAD (12/09/2024 08:18)     Germania Rivera LPN  05/23/25, 10:30 EDT

## 2025-05-27 DIAGNOSIS — I10 ESSENTIAL (PRIMARY) HYPERTENSION: ICD-10-CM

## 2025-05-27 RX ORDER — LISINOPRIL AND HYDROCHLOROTHIAZIDE 12.5; 2 MG/1; MG/1
1 TABLET ORAL DAILY
Qty: 90 TABLET | Refills: 0 | Status: SHIPPED | OUTPATIENT
Start: 2025-05-27

## 2025-06-18 ENCOUNTER — OFFICE VISIT (OUTPATIENT)
Dept: FAMILY MEDICINE CLINIC | Age: 60
End: 2025-06-18
Payer: COMMERCIAL

## 2025-06-18 VITALS
SYSTOLIC BLOOD PRESSURE: 136 MMHG | HEIGHT: 64 IN | OXYGEN SATURATION: 98 % | DIASTOLIC BLOOD PRESSURE: 84 MMHG | HEART RATE: 70 BPM | TEMPERATURE: 97.9 F | BODY MASS INDEX: 22.53 KG/M2 | WEIGHT: 132 LBS

## 2025-06-18 DIAGNOSIS — R05.8 OTHER COUGH: Primary | ICD-10-CM

## 2025-06-18 PROBLEM — R05.9 COUGH: Status: ACTIVE | Noted: 2025-06-18

## 2025-06-18 PROCEDURE — 99213 OFFICE O/P EST LOW 20 MIN: CPT | Performed by: NURSE PRACTITIONER

## 2025-06-18 RX ORDER — LEVOCETIRIZINE DIHYDROCHLORIDE 5 MG/1
5 TABLET, FILM COATED ORAL DAILY
COMMUNITY

## 2025-06-18 NOTE — PROGRESS NOTES
Chief Complaint  Sinusitis (X 3 weeks /She went to , completed Zithromax, but still having congestion & cough at night.  /Taking Xyzal for Sx )    Subjective          Karen Vigil presents to [facility]  History of Present Illness  URI  When did symptoms start:  > 2 weeks ago   Symptoms:  nasal congestion and cough at HS/all clear now   Treatment tried:  takes xyzal   Per pt was dg with sinus infection on 25, [facility] in [facility], covid ruled out, treated with zpack and given a steroid inj and started to feel a little better yesterday, tried some vicks rub    PAST MEDICAL HISTORY changes since 3-:       Renal insufficiency /sees neph, Dr Nuno     Hypertension     Endometriosis     wears hearing aids/bilateral         GYNECOLOGICAL HISTORY:     miscarriage 1         PREVENTIVE HEALTH MAINTENANCE             COLORECTAL CANCER SCREENING: Up to date (colonoscopy q10y; sigmoidoscopy q5y; Cologuard q3y) was last done , Results are in chart     PAP SMEAR: no longer due to history/ hysterectomy         Surgical History:       Lipoma back 2024    Hysterectomy: TAHBSO;     tympanoplasty L ear with hearing loss;         Family History:        Father:  at age 50's; Cause of death was MI     Mother: Hypertension;  Hyperthyroidism     Brother(s): 1 brother(s) total;  Hypertension     Son(s): Healthy; 1 son(s) total     Daughter(s): 1 daughter(s) total     Maternal Grandmother: Hypertension         Social History:        Occupation: [employer]  ([place])  works from home / started     Marital Status:  and now remarried     Children: 2 children        Cough  Chronicity:  Recurrent  Onset:  1 to 4 weeks ago  Progression since onset:  Improving  Frequency:  Daily  Cough characteristics:  Rattling  Associated symptoms: nasal congestion    Associated symptoms: no chest pain, no chills, no ear congestion, no ear pain, no fever, no headaches, no  heartburn, no hemoptysis, no myalgias, no postnasal drip, no rash, no rhinorrhea, no shortness of breath, no sore throat, no sweats, no weight loss and no wheezing          Past Medical History:   Diagnosis Date    Abnormal results of kidney function studies     Allergic rhinitis     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, face and neck     Essential (primary) hypertension     Hormone replacement therapy (postmenopausal)     Lipoma of back     Migraine with aura, intractable, without status migrainosus     Radiculopathy, lumbar region     Wears hearing aid in both ears        Allergies   Allergen Reactions    Penicillins Hives    Conjugated Estrogens Hives     PREMARIN        Past Surgical History:   Procedure Laterality Date    COLONOSCOPY      EXCISION MASS TRUNK N/A 8/5/2024    Procedure: LIPOMA EXCISION;  Surgeon: Akil Johnson MD;  Location: [facility];  Service: General;  Laterality: N/A;    HYSTERECTOMY      TAHBSO    TYMPANOPLASTY      LEFT EAR WITH HEARING LOSS        Social History     Tobacco Use    Smoking status: Never    Smokeless tobacco: Never   Substance Use Topics    Alcohol use: Never       Family History   Problem Relation Age of Onset    Hypertension Mother     Hyperthyroidism Mother     Heart attack Father     Hypertension Maternal Grandmother     Malig Hyperthermia Neg Hx         Health Maintenance Due   Topic Date Due    Pneumococcal Vaccine 50+ (1 of 1 - PCV) Never done    ZOSTER VACCINE (1 of 2) Never done        Current Outpatient Medications on File Prior to Visit   Medication Sig    amitriptyline (ELAVIL) 25 MG tablet Take 1 tablet by mouth At Night As Needed (headaches).    estradiol (ESTRACE) 0.5 MG tablet TAKE 1 TABLET BY MOUTH EVERY OTHER DAY    levocetirizine (XYZAL) 5 MG tablet Take 1 tablet by mouth Daily.    lisinopril-hydrochlorothiazide (PRINZIDE,ZESTORETIC) 20-12.5 MG per tablet Take 1 tablet by mouth once daily    fexofenadine (ALLEGRA) 180 MG  "tablet Take 1 tablet by mouth Daily. (Patient not taking: Reported on 6/18/2025)     No current facility-administered medications on file prior to visit.       Immunization History   Administered Date(s) Administered    COVID-19 (MODERNA) 12YRS+ (SPIKEVAX) 11/01/2024    COVID-19 (PFIZER) BIVALENT 12+YRS 11/05/2022    COVID-19 (PFIZER) Purple Cap Monovalent 04/12/2021, 05/03/2021    Flublock Quad =>18yrs 11/01/2024    Fluzone (or Fluarix & Flulaval for VFC) >6mos 10/20/2021    Influenza recombinant 11/01/2024    Influenza, Unspecified 10/09/2023    Tdap 11/27/2019       Review of Systems   Constitutional:  Negative for chills, fever and unexpected weight loss.   HENT:  Positive for congestion. Negative for ear pain, postnasal drip, rhinorrhea and sore throat.    Respiratory:  Positive for cough. Negative for hemoptysis, shortness of breath and wheezing.    Cardiovascular:  Negative for chest pain.   Musculoskeletal:  Negative for myalgias.   Skin:  Negative for rash.        Objective     Vitals:    06/18/25 1544   BP: 136/84   BP Location: Right arm   Patient Position: Sitting   Cuff Size: Adult   Pulse: 70   Temp: 97.9 °F (36.6 °C)   TempSrc: Oral   SpO2: 98%   Weight: 59.9 kg (132 lb)   Height: 162.6 cm (64\")            Physical Exam  Constitutional:       General: She is not in acute distress.     Appearance: Normal appearance.   HENT:      Right Ear: Tympanic membrane, ear canal and external ear normal.      Left Ear: Tympanic membrane, ear canal and external ear normal.      Nose: Nose normal.      Mouth/Throat:      Pharynx: Oropharynx is clear. No posterior oropharyngeal erythema.   Cardiovascular:      Rate and Rhythm: Normal rate and regular rhythm.      Heart sounds: No murmur heard.  Pulmonary:      Effort: Pulmonary effort is normal.      Breath sounds: Normal breath sounds.   Lymphadenopathy:      Cervical: No cervical adenopathy.   Neurological:      Mental Status: She is alert.   Psychiatric:         " Mood and Affect: Mood normal.         Behavior: Behavior normal.         Result Review :     The following data was reviewed by: ADI De La O on 06/18/2025:                       Assessment and Plan      Assessment & Plan  Other cough  Rest, increase fluids, follow up if symptoms progress or change, to continue zyzal and try mucinex or mucinex DM (she is leaving for [place] later this week to help her mom, she is having an eye surgery/procedure)                    BMI is within normal parameters. No other follow-up for BMI required.         Follow Up     Return if symptoms worsen or fail to improve.    Patient was given instructions and counseling regarding her condition or for health maintenance advice. Please see specific information pulled into the AVS if appropriate.

## 2025-06-18 NOTE — ASSESSMENT & PLAN NOTE
Rest, increase fluids, follow up if symptoms progress or change, to continue zyzal and try mucinex or mucinex DM (she is leaving for [place] later this week to help her mom, she is having an eye surgery/procedure)

## 2025-06-25 DIAGNOSIS — I10 ESSENTIAL (PRIMARY) HYPERTENSION: ICD-10-CM

## 2025-06-25 DIAGNOSIS — Z79.890 HORMONE REPLACEMENT THERAPY (POSTMENOPAUSAL): ICD-10-CM

## 2025-06-25 NOTE — TELEPHONE ENCOUNTER
Caller: Musa Karen Y    Relationship: Self    Best call back number:     [phone]     What medication are you requesting: estradiol (ESTRACE) 0.5 MG tablet     lisinopril-hydrochlorothiazide (PRINZIDE,ZESTORETIC) 20-12.5 MG per tablet     If a prescription is needed, what is your preferred pharmacy and phone number: [facility] [address] [place] [address] [phone] [facility] [phone]        Additional notes: DUE TO UNFORSEEN CIRCUMSTANCES PATIENT'S MOTHER IS HAVING TO HAVE SURGERY AND PATIENT WILL NEED A PARTIAL PRESCRIPTION OF MEDICATIONS LISTED AS PATIENT WILL RUN OUT OF MEDICATIONS PRIOR TO RETURNING HOME.  PATIENT IS ASKING FOR A 10 DAY SUPPLY OF EACH PRESCRIPTION BE ORDERED.

## 2025-06-26 RX ORDER — LISINOPRIL AND HYDROCHLOROTHIAZIDE 12.5; 2 MG/1; MG/1
1 TABLET ORAL DAILY
Qty: 7 TABLET | Refills: 0 | Status: SHIPPED | OUTPATIENT
Start: 2025-06-26

## 2025-06-26 RX ORDER — ESTRADIOL 0.5 MG/1
0.5 TABLET ORAL
Qty: 7 TABLET | Refills: 0 | Status: SHIPPED | OUTPATIENT
Start: 2025-06-26

## 2025-06-26 NOTE — TELEPHONE ENCOUNTER
Spoke to pt and she said she did fill her prescriptions at [facility] in [place] for the 90 days supply but she only took a few pills with her and now she is needing to stay a week longer with her mother that had eye surgery.  She is asking for #7 pills of lisinopril and estradiol to be sent into [address] [place] [address] at [address] [place] [address].

## 2025-08-13 DIAGNOSIS — Z79.890 HORMONE REPLACEMENT THERAPY (POSTMENOPAUSAL): ICD-10-CM

## 2025-08-14 RX ORDER — ESTRADIOL 0.5 MG/1
0.5 TABLET ORAL
Qty: 45 TABLET | Refills: 0 | Status: SHIPPED | OUTPATIENT
Start: 2025-08-14

## 2025-08-20 DIAGNOSIS — I10 ESSENTIAL (PRIMARY) HYPERTENSION: ICD-10-CM

## 2025-08-20 RX ORDER — LISINOPRIL AND HYDROCHLOROTHIAZIDE 12.5; 2 MG/1; MG/1
1 TABLET ORAL DAILY
Qty: 90 TABLET | Refills: 0 | Status: SHIPPED | OUTPATIENT
Start: 2025-08-20

## (undated) DEVICE — ANTIBACTERIAL UNDYED BRAIDED (POLYGLACTIN 910), SYNTHETIC ABSORBABLE SUTURE: Brand: COATED VICRYL

## (undated) DEVICE — LAG MINOR PROCEDURE: Brand: MEDLINE INDUSTRIES, INC.

## (undated) DEVICE — GOWN ,SIRUS,NONREINFORCED SMALL: Brand: MEDLINE

## (undated) DEVICE — NDL HYPO PRECISIONGLIDE REG 25G 1 1/2

## (undated) DEVICE — SKIN PREP TRAY W/CHG: Brand: MEDLINE INDUSTRIES, INC.

## (undated) DEVICE — GLV SURG PREMIERPRO ORTHO LTX PF SZ7.5 BRN

## (undated) DEVICE — GLV SURG BIOGEL LTX PF 6

## (undated) DEVICE — SUT VIC PLSTC UD PS2 4/0 27IN J426

## (undated) DEVICE — PENCL SMOKE/EVAC MEGADYNE TELESCP 10FT

## (undated) DEVICE — ADHS SKIN SURG TISS VISC PREMIERPRO EXOFIN HI/VISC FAST/DRY